# Patient Record
Sex: MALE | Race: BLACK OR AFRICAN AMERICAN | NOT HISPANIC OR LATINO | Employment: FULL TIME | URBAN - METROPOLITAN AREA
[De-identification: names, ages, dates, MRNs, and addresses within clinical notes are randomized per-mention and may not be internally consistent; named-entity substitution may affect disease eponyms.]

---

## 2017-01-06 ENCOUNTER — TRANSCRIBE ORDERS (OUTPATIENT)
Dept: ADMINISTRATIVE | Facility: HOSPITAL | Age: 37
End: 2017-01-06

## 2017-01-06 ENCOUNTER — HOSPITAL ENCOUNTER (OUTPATIENT)
Dept: CARDIOLOGY | Facility: HOSPITAL | Age: 37
Discharge: HOME | End: 2017-01-06
Attending: PSYCHIATRY & NEUROLOGY | Admitting: PSYCHIATRY & NEUROLOGY

## 2017-01-06 DIAGNOSIS — I82.402 DVT, LOWER EXTREMITY, RECURRENT, LEFT (HCC): Primary | ICD-10-CM

## 2017-01-06 DIAGNOSIS — I82.402 DVT, LOWER EXTREMITY, RECURRENT, LEFT (HCC): ICD-10-CM

## 2017-01-06 LAB
BH CV ECHO MEAS - BSA(HAYCOCK): 2.8 M^2
BH CV ECHO MEAS - BSA: 2.6 M^2
BH CV ECHO MEAS - BZI_BMI: 46.7 KILOGRAMS/M^2
BH CV ECHO MEAS - BZI_METRIC_HEIGHT: 180.3 CM
BH CV ECHO MEAS - BZI_METRIC_WEIGHT: 152 KG
BH CV LOWER VASCULAR LEFT COMMON FEMORAL AUGMENT: NORMAL
BH CV LOWER VASCULAR LEFT COMMON FEMORAL COMPRESS: NORMAL
BH CV LOWER VASCULAR LEFT COMMON FEMORAL PHASIC: NORMAL
BH CV LOWER VASCULAR LEFT COMMON FEMORAL SPONT: NORMAL
BH CV LOWER VASCULAR LEFT DISTAL FEMORAL AUGMENT: NORMAL
BH CV LOWER VASCULAR LEFT DISTAL FEMORAL COMPRESS: NORMAL
BH CV LOWER VASCULAR LEFT DISTAL FEMORAL PHASIC: NORMAL
BH CV LOWER VASCULAR LEFT DISTAL FEMORAL SPONT: NORMAL
BH CV LOWER VASCULAR LEFT GASTRONEMIUS COMPRESS: NORMAL
BH CV LOWER VASCULAR LEFT GREATER SAPH AK COMPRESS: NORMAL
BH CV LOWER VASCULAR LEFT GREATER SAPH BK COMPRESS: NORMAL
BH CV LOWER VASCULAR LEFT LESSER SAPH COMPRESS: NORMAL
BH CV LOWER VASCULAR LEFT MID FEMORAL AUGMENT: NORMAL
BH CV LOWER VASCULAR LEFT MID FEMORAL COMPRESS: NORMAL
BH CV LOWER VASCULAR LEFT MID FEMORAL PHASIC: NORMAL
BH CV LOWER VASCULAR LEFT MID FEMORAL SPONT: NORMAL
BH CV LOWER VASCULAR LEFT PERONEAL AUGMENT: NORMAL
BH CV LOWER VASCULAR LEFT PERONEAL COMPRESS: NORMAL
BH CV LOWER VASCULAR LEFT POPLITEAL AUGMENT: NORMAL
BH CV LOWER VASCULAR LEFT POPLITEAL COMPRESS: NORMAL
BH CV LOWER VASCULAR LEFT POPLITEAL PHASIC: NORMAL
BH CV LOWER VASCULAR LEFT POPLITEAL SPONT: NORMAL
BH CV LOWER VASCULAR LEFT POSTERIOR TIBIAL AUGMENT: NORMAL
BH CV LOWER VASCULAR LEFT POSTERIOR TIBIAL COMPRESS: NORMAL
BH CV LOWER VASCULAR LEFT PROXIMAL FEMORAL AUGMENT: NORMAL
BH CV LOWER VASCULAR LEFT PROXIMAL FEMORAL COMPRESS: NORMAL
BH CV LOWER VASCULAR LEFT PROXIMAL FEMORAL PHASIC: NORMAL
BH CV LOWER VASCULAR LEFT PROXIMAL FEMORAL SPONT: NORMAL
BH CV LOWER VASCULAR LEFT SAPHENOFEMORAL JUNCTION AUGMENT: NORMAL
BH CV LOWER VASCULAR LEFT SAPHENOFEMORAL JUNCTION COMPRESS: NORMAL
BH CV LOWER VASCULAR LEFT SAPHENOFEMORAL JUNCTION PHASIC: NORMAL
BH CV LOWER VASCULAR LEFT SAPHENOFEMORAL JUNCTION SPONT: NORMAL
BH CV LOWER VASCULAR RIGHT COMMON FEMORAL AUGMENT: NORMAL
BH CV LOWER VASCULAR RIGHT COMMON FEMORAL COMPRESS: NORMAL
BH CV LOWER VASCULAR RIGHT COMMON FEMORAL PHASIC: NORMAL
BH CV LOWER VASCULAR RIGHT COMMON FEMORAL SPONT: NORMAL
BH CV LOWER VASCULAR RIGHT PERONEAL AUGMENT: NORMAL
BH CV LOWER VASCULAR RIGHT POSTERIOR TIBIAL AUGMENT: NORMAL
BH CV LOWER VASCULAR RIGHT SAPHENOFEMORAL JUNCTION AUGMENT: NORMAL
BH CV LOWER VASCULAR RIGHT SAPHENOFEMORAL JUNCTION COMPRESS: NORMAL
BH CV LOWER VASCULAR RIGHT SAPHENOFEMORAL JUNCTION PHASIC: NORMAL
BH CV LOWER VASCULAR RIGHT SAPHENOFEMORAL JUNCTION SPONT: NORMAL

## 2017-01-06 PROCEDURE — 93971 EXTREMITY STUDY: CPT

## 2017-01-09 ENCOUNTER — TRANSCRIBE ORDERS (OUTPATIENT)
Dept: ADMINISTRATIVE | Facility: HOSPITAL | Age: 37
End: 2017-01-09

## 2017-01-09 DIAGNOSIS — M54.5 LOW BACK PAIN, UNSPECIFIED BACK PAIN LATERALITY, UNSPECIFIED CHRONICITY, WITH SCIATICA PRESENCE UNSPECIFIED: Primary | ICD-10-CM

## 2017-01-13 ENCOUNTER — HOSPITAL ENCOUNTER (OUTPATIENT)
Dept: MRI IMAGING | Facility: HOSPITAL | Age: 37
Discharge: HOME | End: 2017-01-13
Attending: PSYCHIATRY & NEUROLOGY | Admitting: PSYCHIATRY & NEUROLOGY

## 2017-01-13 DIAGNOSIS — M54.5 LOW BACK PAIN, UNSPECIFIED BACK PAIN LATERALITY, UNSPECIFIED CHRONICITY, WITH SCIATICA PRESENCE UNSPECIFIED: ICD-10-CM

## 2017-01-13 PROCEDURE — 72148 MRI LUMBAR SPINE W/O DYE: CPT

## 2018-10-24 ENCOUNTER — APPOINTMENT (OUTPATIENT)
Dept: GENERAL RADIOLOGY | Facility: HOSPITAL | Age: 38
End: 2018-10-24

## 2018-10-24 ENCOUNTER — HOSPITAL ENCOUNTER (INPATIENT)
Facility: HOSPITAL | Age: 38
LOS: 4 days | Discharge: HOME | End: 2018-10-28
Attending: EMERGENCY MEDICINE | Admitting: INTERNAL MEDICINE

## 2018-10-24 ENCOUNTER — APPOINTMENT (OUTPATIENT)
Dept: CT IMAGING | Facility: HOSPITAL | Age: 38
End: 2018-10-24

## 2018-10-24 DIAGNOSIS — R06.03 RESPIRATORY DISTRESS: ICD-10-CM

## 2018-10-24 DIAGNOSIS — R06.00 DYSPNEA, UNSPECIFIED TYPE: ICD-10-CM

## 2018-10-24 DIAGNOSIS — I26.99 BILATERAL PULMONARY EMBOLISM (HCC): Primary | ICD-10-CM

## 2018-10-24 DIAGNOSIS — R09.02 HYPOXIA: ICD-10-CM

## 2018-10-24 DIAGNOSIS — R07.9 CHEST PAIN, UNSPECIFIED TYPE: ICD-10-CM

## 2018-10-24 LAB
ALBUMIN SERPL-MCNC: 4.3 G/DL (ref 3.2–4.8)
ALBUMIN/GLOB SERPL: 1.2 G/DL (ref 1.5–2.5)
ALP SERPL-CCNC: 62 U/L (ref 25–100)
ALT SERPL W P-5'-P-CCNC: 20 U/L (ref 7–40)
ANION GAP SERPL CALCULATED.3IONS-SCNC: 6 MMOL/L (ref 3–11)
APTT PPP: 30.8 SECONDS (ref 55–70)
AST SERPL-CCNC: 20 U/L (ref 0–33)
BASOPHILS # BLD AUTO: 0.01 10*3/MM3 (ref 0–0.2)
BASOPHILS NFR BLD AUTO: 0.1 % (ref 0–1)
BILIRUB SERPL-MCNC: 0.8 MG/DL (ref 0.3–1.2)
BNP SERPL-MCNC: 162 PG/ML (ref 0–100)
BUN BLD-MCNC: 18 MG/DL (ref 9–23)
BUN/CREAT SERPL: 14 (ref 7–25)
CALCIUM SPEC-SCNC: 9.3 MG/DL (ref 8.7–10.4)
CHLORIDE SERPL-SCNC: 100 MMOL/L (ref 99–109)
CO2 SERPL-SCNC: 32 MMOL/L (ref 20–31)
CREAT BLD-MCNC: 1.29 MG/DL (ref 0.6–1.3)
DEPRECATED RDW RBC AUTO: 43.9 FL (ref 37–54)
EOSINOPHIL # BLD AUTO: 0.11 10*3/MM3 (ref 0–0.3)
EOSINOPHIL NFR BLD AUTO: 0.9 % (ref 0–3)
ERYTHROCYTE [DISTWIDTH] IN BLOOD BY AUTOMATED COUNT: 15 % (ref 11.3–14.5)
GFR SERPL CREATININE-BSD FRML MDRD: 76 ML/MIN/1.73
GLOBULIN UR ELPH-MCNC: 3.6 GM/DL
GLUCOSE BLD-MCNC: 94 MG/DL (ref 70–100)
HCT VFR BLD AUTO: 57.1 % (ref 38.9–50.9)
HGB BLD-MCNC: 17.8 G/DL (ref 13.1–17.5)
HOLD SPECIMEN: NORMAL
HOLD SPECIMEN: NORMAL
IMM GRANULOCYTES # BLD: 0.03 10*3/MM3 (ref 0–0.03)
IMM GRANULOCYTES NFR BLD: 0.3 % (ref 0–0.6)
INR PPP: 1.07 (ref 0.91–1.09)
LYMPHOCYTES # BLD AUTO: 2.7 10*3/MM3 (ref 0.6–4.8)
LYMPHOCYTES NFR BLD AUTO: 22.7 % (ref 24–44)
MCH RBC QN AUTO: 25.5 PG (ref 27–31)
MCHC RBC AUTO-ENTMCNC: 31.2 G/DL (ref 32–36)
MCV RBC AUTO: 81.9 FL (ref 80–99)
MONOCYTES # BLD AUTO: 0.97 10*3/MM3 (ref 0–1)
MONOCYTES NFR BLD AUTO: 8.1 % (ref 0–12)
NEUTROPHILS # BLD AUTO: 8.13 10*3/MM3 (ref 1.5–8.3)
NEUTROPHILS NFR BLD AUTO: 68.2 % (ref 41–71)
PLATELET # BLD AUTO: 230 10*3/MM3 (ref 150–450)
PMV BLD AUTO: 10.4 FL (ref 6–12)
POTASSIUM BLD-SCNC: 4.1 MMOL/L (ref 3.5–5.5)
PROT SERPL-MCNC: 7.9 G/DL (ref 5.7–8.2)
PROTHROMBIN TIME: 11.2 SECONDS (ref 9.6–11.5)
RBC # BLD AUTO: 6.97 10*6/MM3 (ref 4.2–5.76)
SODIUM BLD-SCNC: 138 MMOL/L (ref 132–146)
TROPONIN I SERPL-MCNC: 0.03 NG/ML (ref 0–0.07)
WBC NRBC COR # BLD: 11.92 10*3/MM3 (ref 3.5–10.8)
WHOLE BLOOD HOLD SPECIMEN: NORMAL
WHOLE BLOOD HOLD SPECIMEN: NORMAL

## 2018-10-24 PROCEDURE — 99285 EMERGENCY DEPT VISIT HI MDM: CPT

## 2018-10-24 PROCEDURE — 84145 PROCALCITONIN (PCT): CPT | Performed by: PHYSICIAN ASSISTANT

## 2018-10-24 PROCEDURE — 85610 PROTHROMBIN TIME: CPT | Performed by: EMERGENCY MEDICINE

## 2018-10-24 PROCEDURE — 93005 ELECTROCARDIOGRAM TRACING: CPT | Performed by: EMERGENCY MEDICINE

## 2018-10-24 PROCEDURE — 25010000002 HEPARIN (PORCINE) PER 1000 UNITS: Performed by: EMERGENCY MEDICINE

## 2018-10-24 PROCEDURE — 80053 COMPREHEN METABOLIC PANEL: CPT | Performed by: EMERGENCY MEDICINE

## 2018-10-24 PROCEDURE — 83880 ASSAY OF NATRIURETIC PEPTIDE: CPT | Performed by: EMERGENCY MEDICINE

## 2018-10-24 PROCEDURE — 71275 CT ANGIOGRAPHY CHEST: CPT

## 2018-10-24 PROCEDURE — 84484 ASSAY OF TROPONIN QUANT: CPT

## 2018-10-24 PROCEDURE — 85730 THROMBOPLASTIN TIME PARTIAL: CPT | Performed by: EMERGENCY MEDICINE

## 2018-10-24 PROCEDURE — 0 IOPAMIDOL PER 1 ML: Performed by: EMERGENCY MEDICINE

## 2018-10-24 PROCEDURE — 93005 ELECTROCARDIOGRAM TRACING: CPT | Performed by: INTERNAL MEDICINE

## 2018-10-24 PROCEDURE — 85025 COMPLETE CBC W/AUTO DIFF WBC: CPT | Performed by: EMERGENCY MEDICINE

## 2018-10-24 RX ORDER — HEPARIN SODIUM 1000 [USP'U]/ML
10000 INJECTION, SOLUTION INTRAVENOUS; SUBCUTANEOUS AS NEEDED
Status: DISCONTINUED | OUTPATIENT
Start: 2018-10-24 | End: 2018-10-24 | Stop reason: DRUGHIGH

## 2018-10-24 RX ORDER — HEPARIN SODIUM 1000 [USP'U]/ML
10000 INJECTION, SOLUTION INTRAVENOUS; SUBCUTANEOUS ONCE
Status: COMPLETED | OUTPATIENT
Start: 2018-10-24 | End: 2018-10-24

## 2018-10-24 RX ORDER — SODIUM CHLORIDE 0.9 % (FLUSH) 0.9 %
10 SYRINGE (ML) INJECTION AS NEEDED
Status: DISCONTINUED | OUTPATIENT
Start: 2018-10-24 | End: 2018-10-26

## 2018-10-24 RX ORDER — HEPARIN SODIUM 1000 [USP'U]/ML
40 INJECTION, SOLUTION INTRAVENOUS; SUBCUTANEOUS AS NEEDED
Status: DISCONTINUED | OUTPATIENT
Start: 2018-10-24 | End: 2018-10-24 | Stop reason: DRUGHIGH

## 2018-10-24 RX ADMIN — HEPARIN SODIUM 8.6 UNITS/KG/HR: 10000 INJECTION, SOLUTION INTRAVENOUS at 23:12

## 2018-10-24 RX ADMIN — IOPAMIDOL 100 ML: 755 INJECTION, SOLUTION INTRAVENOUS at 22:15

## 2018-10-24 RX ADMIN — HEPARIN SODIUM 10000 UNITS: 1000 INJECTION, SOLUTION INTRAVENOUS; SUBCUTANEOUS at 23:11

## 2018-10-25 PROBLEM — G47.33 OSA (OBSTRUCTIVE SLEEP APNEA): Status: ACTIVE | Noted: 2018-10-25

## 2018-10-25 PROBLEM — D72.829 LEUKOCYTOSIS: Status: ACTIVE | Noted: 2018-10-25

## 2018-10-25 LAB
ANION GAP SERPL CALCULATED.3IONS-SCNC: 8 MMOL/L (ref 3–11)
APTT PPP: 39.3 SECONDS (ref 55–70)
APTT PPP: 47.6 SECONDS (ref 55–70)
APTT PPP: 89.1 SECONDS (ref 55–70)
BUN BLD-MCNC: 18 MG/DL (ref 9–23)
BUN/CREAT SERPL: 14.2 (ref 7–25)
CALCIUM SPEC-SCNC: 8.7 MG/DL (ref 8.7–10.4)
CHLORIDE SERPL-SCNC: 99 MMOL/L (ref 99–109)
CO2 SERPL-SCNC: 31 MMOL/L (ref 20–31)
CREAT BLD-MCNC: 1.27 MG/DL (ref 0.6–1.3)
DEPRECATED RDW RBC AUTO: 44.5 FL (ref 37–54)
ERYTHROCYTE [DISTWIDTH] IN BLOOD BY AUTOMATED COUNT: 15 % (ref 11.3–14.5)
GFR SERPL CREATININE-BSD FRML MDRD: 77 ML/MIN/1.73
GLUCOSE BLD-MCNC: 96 MG/DL (ref 70–100)
HCT VFR BLD AUTO: 53.4 % (ref 38.9–50.9)
HCYS SERPL-MCNC: 13.8 UMOL/L (ref 5–13.9)
HGB BLD-MCNC: 16.5 G/DL (ref 13.1–17.5)
MCH RBC QN AUTO: 25.3 PG (ref 27–31)
MCHC RBC AUTO-ENTMCNC: 30.9 G/DL (ref 32–36)
MCV RBC AUTO: 82 FL (ref 80–99)
PLATELET # BLD AUTO: 234 10*3/MM3 (ref 150–450)
PMV BLD AUTO: 9.9 FL (ref 6–12)
POTASSIUM BLD-SCNC: 4.7 MMOL/L (ref 3.5–5.5)
PROCALCITONIN SERPL-MCNC: <0.05 NG/ML
RBC # BLD AUTO: 6.51 10*6/MM3 (ref 4.2–5.76)
SODIUM BLD-SCNC: 138 MMOL/L (ref 132–146)
WBC NRBC COR # BLD: 11.39 10*3/MM3 (ref 3.5–10.8)

## 2018-10-25 PROCEDURE — 85300 ANTITHROMBIN III ACTIVITY: CPT | Performed by: INTERNAL MEDICINE

## 2018-10-25 PROCEDURE — 85613 RUSSELL VIPER VENOM DILUTED: CPT | Performed by: INTERNAL MEDICINE

## 2018-10-25 PROCEDURE — 85306 CLOT INHIBIT PROT S FREE: CPT | Performed by: INTERNAL MEDICINE

## 2018-10-25 PROCEDURE — 81240 F2 GENE: CPT | Performed by: INTERNAL MEDICINE

## 2018-10-25 PROCEDURE — 85705 THROMBOPLASTIN INHIBITION: CPT | Performed by: INTERNAL MEDICINE

## 2018-10-25 PROCEDURE — 83090 ASSAY OF HOMOCYSTEINE: CPT | Performed by: INTERNAL MEDICINE

## 2018-10-25 PROCEDURE — 25010000002 HEPARIN (PORCINE) PER 1000 UNITS

## 2018-10-25 PROCEDURE — 80048 BASIC METABOLIC PNL TOTAL CA: CPT | Performed by: PHYSICIAN ASSISTANT

## 2018-10-25 PROCEDURE — 85670 THROMBIN TIME PLASMA: CPT | Performed by: INTERNAL MEDICINE

## 2018-10-25 PROCEDURE — 99223 1ST HOSP IP/OBS HIGH 75: CPT | Performed by: INTERNAL MEDICINE

## 2018-10-25 PROCEDURE — 81241 F5 GENE: CPT | Performed by: INTERNAL MEDICINE

## 2018-10-25 PROCEDURE — 86147 CARDIOLIPIN ANTIBODY EA IG: CPT | Performed by: INTERNAL MEDICINE

## 2018-10-25 PROCEDURE — 85730 THROMBOPLASTIN TIME PARTIAL: CPT

## 2018-10-25 PROCEDURE — 99233 SBSQ HOSP IP/OBS HIGH 50: CPT | Performed by: INTERNAL MEDICINE

## 2018-10-25 PROCEDURE — 85732 THROMBOPLASTIN TIME PARTIAL: CPT | Performed by: INTERNAL MEDICINE

## 2018-10-25 PROCEDURE — 85303 CLOT INHIBIT PROT C ACTIVITY: CPT | Performed by: INTERNAL MEDICINE

## 2018-10-25 PROCEDURE — 85027 COMPLETE CBC AUTOMATED: CPT | Performed by: PHYSICIAN ASSISTANT

## 2018-10-25 RX ORDER — SODIUM CHLORIDE 0.9 % (FLUSH) 0.9 %
3 SYRINGE (ML) INJECTION EVERY 12 HOURS SCHEDULED
Status: DISCONTINUED | OUTPATIENT
Start: 2018-10-25 | End: 2018-10-26

## 2018-10-25 RX ORDER — HYDROCODONE BITARTRATE AND ACETAMINOPHEN 7.5; 325 MG/1; MG/1
1 TABLET ORAL EVERY 4 HOURS PRN
Status: DISCONTINUED | OUTPATIENT
Start: 2018-10-25 | End: 2018-10-28

## 2018-10-25 RX ORDER — HEPARIN SODIUM 1000 [USP'U]/ML
7000 INJECTION, SOLUTION INTRAVENOUS; SUBCUTANEOUS ONCE
Status: COMPLETED | OUTPATIENT
Start: 2018-10-26 | End: 2018-10-25

## 2018-10-25 RX ORDER — HYDROCODONE BITARTRATE AND ACETAMINOPHEN 5; 325 MG/1; MG/1
1 TABLET ORAL EVERY 6 HOURS PRN
Status: DISCONTINUED | OUTPATIENT
Start: 2018-10-25 | End: 2018-10-25

## 2018-10-25 RX ORDER — ACETAMINOPHEN 325 MG/1
650 TABLET ORAL EVERY 4 HOURS PRN
Status: DISCONTINUED | OUTPATIENT
Start: 2018-10-25 | End: 2018-10-28 | Stop reason: HOSPADM

## 2018-10-25 RX ORDER — HEPARIN SODIUM 1000 [USP'U]/ML
10000 INJECTION, SOLUTION INTRAVENOUS; SUBCUTANEOUS ONCE
Status: COMPLETED | OUTPATIENT
Start: 2018-10-25 | End: 2018-10-25

## 2018-10-25 RX ORDER — SODIUM CHLORIDE 0.9 % (FLUSH) 0.9 %
3-10 SYRINGE (ML) INJECTION AS NEEDED
Status: DISCONTINUED | OUTPATIENT
Start: 2018-10-25 | End: 2018-10-28 | Stop reason: HOSPADM

## 2018-10-25 RX ADMIN — HEPARIN SODIUM 10 UNITS/KG/HR: 10000 INJECTION, SOLUTION INTRAVENOUS at 16:17

## 2018-10-25 RX ADMIN — HEPARIN SODIUM 7000 UNITS: 1000 INJECTION, SOLUTION INTRAVENOUS; SUBCUTANEOUS at 23:41

## 2018-10-25 RX ADMIN — HYDROCODONE BITARTRATE AND ACETAMINOPHEN 1 TABLET: 7.5; 325 TABLET ORAL at 19:02

## 2018-10-25 RX ADMIN — HEPARIN SODIUM 12 UNITS/KG/HR: 10000 INJECTION, SOLUTION INTRAVENOUS at 14:14

## 2018-10-25 RX ADMIN — HYDROCODONE BITARTRATE AND ACETAMINOPHEN 1 TABLET: 5; 325 TABLET ORAL at 15:34

## 2018-10-25 RX ADMIN — HEPARIN SODIUM 10000 UNITS: 1000 INJECTION, SOLUTION INTRAVENOUS; SUBCUTANEOUS at 09:08

## 2018-10-26 ENCOUNTER — APPOINTMENT (OUTPATIENT)
Dept: CARDIOLOGY | Facility: HOSPITAL | Age: 38
End: 2018-10-26
Attending: INTERNAL MEDICINE

## 2018-10-26 LAB
APTT PPP: 44.3 SECONDS (ref 55–70)
APTT PPP: 86.5 SECONDS (ref 55–70)
BH CV ECHO MEAS - AO ROOT AREA (BSA CORRECTED): 0.99
BH CV ECHO MEAS - AO ROOT AREA: 6 CM^2
BH CV ECHO MEAS - AO ROOT DIAM: 2.8 CM
BH CV ECHO MEAS - BSA(HAYCOCK): 3.1 M^2
BH CV ECHO MEAS - BSA(HAYCOCK): 3.1 M^2
BH CV ECHO MEAS - BSA: 2.8 M^2
BH CV ECHO MEAS - BSA: 2.8 M^2
BH CV ECHO MEAS - BZI_BMI: 53.8 KILOGRAMS/M^2
BH CV ECHO MEAS - BZI_BMI: 53.8 KILOGRAMS/M^2
BH CV ECHO MEAS - BZI_METRIC_HEIGHT: 180.3 CM
BH CV ECHO MEAS - BZI_METRIC_HEIGHT: 180.3 CM
BH CV ECHO MEAS - BZI_METRIC_WEIGHT: 175.1 KG
BH CV ECHO MEAS - BZI_METRIC_WEIGHT: 175.1 KG
BH CV ECHO MEAS - EDV(CUBED): 77.6 ML
BH CV ECHO MEAS - EDV(TEICH): 81.5 ML
BH CV ECHO MEAS - EF(CUBED): 62.4 %
BH CV ECHO MEAS - EF(MOD-BP): 54 %
BH CV ECHO MEAS - EF(TEICH): 54.3 %
BH CV ECHO MEAS - ESV(CUBED): 29.2 ML
BH CV ECHO MEAS - ESV(TEICH): 37.3 ML
BH CV ECHO MEAS - FS: 27.8 %
BH CV ECHO MEAS - IVS/LVPW: 1.1
BH CV ECHO MEAS - IVSD: 1.4 CM
BH CV ECHO MEAS - LV MASS(C)D: 208.9 GRAMS
BH CV ECHO MEAS - LV MASS(C)DI: 74.9 GRAMS/M^2
BH CV ECHO MEAS - LVIDD: 4.3 CM
BH CV ECHO MEAS - LVIDS: 3.1 CM
BH CV ECHO MEAS - LVPWD: 1.2 CM
BH CV ECHO MEAS - RAP SYSTOLE: 15 MMHG
BH CV ECHO MEAS - RVSP: 76 MMHG
BH CV ECHO MEAS - SI(CUBED): 17.4 ML/M^2
BH CV ECHO MEAS - SI(TEICH): 15.9 ML/M^2
BH CV ECHO MEAS - SV(CUBED): 48.4 ML
BH CV ECHO MEAS - SV(TEICH): 44.2 ML
BH CV ECHO MEAS - TAPSE (>1.6): 1.4 CM2
BH CV ECHO MEAS - TR MAX PG: 61 MMHG
BH CV ECHO MEAS - TR MAX VEL: 390.5 CM/SEC
BH CV LOW VAS LEFT COMMON FEMORAL SPONT: 1
BH CV LOW VAS LEFT PROXIMAL FEMORAL SPONT: 1
BH CV LOW VAS RIGHT COMMON FEMORAL SPONT: 1
BH CV LOW VAS RIGHT POPLITEAL SPONT: 1
BH CV LOW VAS RIGHT PROXIMAL FEMORAL SPONT: 1
BH CV LOWER VASCULAR LEFT COMMON FEMORAL AUGMENT: NORMAL
BH CV LOWER VASCULAR LEFT COMMON FEMORAL COMPETENT: NORMAL
BH CV LOWER VASCULAR LEFT COMMON FEMORAL COMPRESS: NORMAL
BH CV LOWER VASCULAR LEFT COMMON FEMORAL PHASIC: NORMAL
BH CV LOWER VASCULAR LEFT COMMON FEMORAL SPONT: NORMAL
BH CV LOWER VASCULAR LEFT GASTRONEMIUS COMPRESS: NORMAL
BH CV LOWER VASCULAR LEFT GREATER SAPH AK COMPRESS: NORMAL
BH CV LOWER VASCULAR LEFT PROXIMAL FEMORAL COMPRESS: NORMAL
BH CV LOWER VASCULAR RIGHT COMMON FEMORAL COMPRESS: NORMAL
BH CV LOWER VASCULAR RIGHT COMMON FEMORAL PHASIC: NORMAL
BH CV LOWER VASCULAR RIGHT COMMON FEMORAL SPONT: NORMAL
BH CV LOWER VASCULAR RIGHT GASTRONEMIUS COMPRESS: NORMAL
BH CV LOWER VASCULAR RIGHT GREATER SAPH AK COMPRESS: NORMAL
BH CV LOWER VASCULAR RIGHT MID FEMORAL COMPRESS: NORMAL
BH CV LOWER VASCULAR RIGHT MID FEMORAL PHASIC: NORMAL
BH CV LOWER VASCULAR RIGHT MID FEMORAL SPONT: NORMAL
BH CV LOWER VASCULAR RIGHT POPLITEAL AUGMENT: NORMAL
BH CV LOWER VASCULAR RIGHT POPLITEAL COMPRESS: NORMAL
BH CV LOWER VASCULAR RIGHT POPLITEAL PHASIC: NORMAL
BH CV LOWER VASCULAR RIGHT POPLITEAL SPONT: NORMAL
BH CV LOWER VASCULAR RIGHT PROXIMAL FEMORAL PHASIC: NORMAL
BH CV LOWER VASCULAR RIGHT PROXIMAL FEMORAL SPONT: NORMAL
BH CV LOWER VASCULAR RIGHT SAPHENOFEMORAL JUNCTION AUGMENT: NORMAL
BH CV LOWER VASCULAR RIGHT SAPHENOFEMORAL JUNCTION COMPRESS: NORMAL
BH CV LOWER VASCULAR RIGHT SAPHENOFEMORAL JUNCTION PHASIC: NORMAL
BH CV LOWER VASCULAR RIGHT SAPHENOFEMORAL JUNCTION SPONT: NORMAL
BH CV VAS BP LEFT ARM: NORMAL MMHG
CARDIOLIPIN IGG SER IA-ACNC: 14 GPL U/ML (ref 0–14)
CARDIOLIPIN IGM SER IA-ACNC: 9 MPL U/ML (ref 0–12)
LV EF 2D ECHO EST: 55 %

## 2018-10-26 PROCEDURE — 25010000002 HEPARIN (PORCINE) PER 1000 UNITS

## 2018-10-26 PROCEDURE — 93971 EXTREMITY STUDY: CPT

## 2018-10-26 PROCEDURE — 93325 DOPPLER ECHO COLOR FLOW MAPG: CPT | Performed by: INTERNAL MEDICINE

## 2018-10-26 PROCEDURE — 99233 SBSQ HOSP IP/OBS HIGH 50: CPT | Performed by: INTERNAL MEDICINE

## 2018-10-26 PROCEDURE — 93308 TTE F-UP OR LMTD: CPT

## 2018-10-26 PROCEDURE — 93321 DOPPLER ECHO F-UP/LMTD STD: CPT

## 2018-10-26 PROCEDURE — 93308 TTE F-UP OR LMTD: CPT | Performed by: INTERNAL MEDICINE

## 2018-10-26 PROCEDURE — 93321 DOPPLER ECHO F-UP/LMTD STD: CPT | Performed by: INTERNAL MEDICINE

## 2018-10-26 PROCEDURE — 85730 THROMBOPLASTIN TIME PARTIAL: CPT

## 2018-10-26 PROCEDURE — 93971 EXTREMITY STUDY: CPT | Performed by: INTERNAL MEDICINE

## 2018-10-26 PROCEDURE — 93325 DOPPLER ECHO COLOR FLOW MAPG: CPT

## 2018-10-26 RX ORDER — SENNA AND DOCUSATE SODIUM 50; 8.6 MG/1; MG/1
2 TABLET, FILM COATED ORAL NIGHTLY
Status: DISCONTINUED | OUTPATIENT
Start: 2018-10-26 | End: 2018-10-28 | Stop reason: HOSPADM

## 2018-10-26 RX ORDER — HEPARIN SODIUM 1000 [USP'U]/ML
7000 INJECTION, SOLUTION INTRAVENOUS; SUBCUTANEOUS ONCE
Status: COMPLETED | OUTPATIENT
Start: 2018-10-26 | End: 2018-10-26

## 2018-10-26 RX ADMIN — HYDROCODONE BITARTRATE AND ACETAMINOPHEN 1 TABLET: 7.5; 325 TABLET ORAL at 20:10

## 2018-10-26 RX ADMIN — HEPARIN SODIUM 7000 UNITS: 1000 INJECTION, SOLUTION INTRAVENOUS; SUBCUTANEOUS at 09:10

## 2018-10-26 RX ADMIN — HEPARIN SODIUM 14 UNITS/KG/HR: 10000 INJECTION, SOLUTION INTRAVENOUS at 15:48

## 2018-10-26 RX ADMIN — HYDROCODONE BITARTRATE AND ACETAMINOPHEN 1 TABLET: 7.5; 325 TABLET ORAL at 05:13

## 2018-10-26 RX ADMIN — HYDROCODONE BITARTRATE AND ACETAMINOPHEN 1 TABLET: 7.5; 325 TABLET ORAL at 15:30

## 2018-10-26 RX ADMIN — SENNOSIDES AND DOCUSATE SODIUM 2 TABLET: 8.6; 5 TABLET ORAL at 20:10

## 2018-10-27 PROBLEM — J96.01 ACUTE HYPOXEMIC RESPIRATORY FAILURE (HCC): Status: ACTIVE | Noted: 2018-10-27

## 2018-10-27 PROBLEM — I82.413 ACUTE BILATERAL DEEP VEIN THROMBOSIS (DVT) OF FEMORAL VEINS: Status: ACTIVE | Noted: 2018-10-27

## 2018-10-27 LAB
ANION GAP SERPL CALCULATED.3IONS-SCNC: 3 MMOL/L (ref 3–11)
APTT PPP: 59.7 SECONDS (ref 55–70)
APTT PPP: 65.7 SECONDS (ref 55–70)
AT III PPP CHRO-ACNC: 84 % (ref 75–135)
BUN BLD-MCNC: 19 MG/DL (ref 9–23)
BUN/CREAT SERPL: 16.8 (ref 7–25)
CALCIUM SPEC-SCNC: 8.8 MG/DL (ref 8.7–10.4)
CHLORIDE SERPL-SCNC: 102 MMOL/L (ref 99–109)
CO2 SERPL-SCNC: 28 MMOL/L (ref 20–31)
CREAT BLD-MCNC: 1.13 MG/DL (ref 0.6–1.3)
DEPRECATED RDW RBC AUTO: 44.5 FL (ref 37–54)
DRVVT IMM 1:2 NP PPP: 41.4 SEC (ref 0–47)
ERYTHROCYTE [DISTWIDTH] IN BLOOD BY AUTOMATED COUNT: 14.7 % (ref 11.3–14.5)
GFR SERPL CREATININE-BSD FRML MDRD: 88 ML/MIN/1.73
GLUCOSE BLD-MCNC: 104 MG/DL (ref 70–100)
HCT VFR BLD AUTO: 50.3 % (ref 38.9–50.9)
HGB BLD-MCNC: 15.5 G/DL (ref 13.1–17.5)
LA NT DPL PPP: 59.6 SEC (ref 0–55)
LA NT DPL/LA NT HPL PPP-RTO: 1.05 RATIO (ref 0–1.4)
LA NT PLATELET PPP: 44 SEC (ref 0–51.9)
LUPUS ANTICOAGULANT REFLEX: ABNORMAL
MAGNESIUM SERPL-MCNC: 2 MG/DL (ref 1.3–2.7)
MCH RBC QN AUTO: 25.6 PG (ref 27–31)
MCHC RBC AUTO-ENTMCNC: 30.8 G/DL (ref 32–36)
MCV RBC AUTO: 83.1 FL (ref 80–99)
PHOSPHATE SERPL-MCNC: 4.1 MG/DL (ref 2.4–5.1)
PLATELET # BLD AUTO: 231 10*3/MM3 (ref 150–450)
PMV BLD AUTO: 9.9 FL (ref 6–12)
POTASSIUM BLD-SCNC: 4.9 MMOL/L (ref 3.5–5.5)
POTASSIUM BLD-SCNC: 4.9 MMOL/L (ref 3.5–5.5)
PROTEIN S-FUNCTIONAL: 80 % (ref 63–140)
RBC # BLD AUTO: 6.05 10*6/MM3 (ref 4.2–5.76)
SCREEN DRVVT: 47.2 SEC (ref 0–47)
SODIUM BLD-SCNC: 133 MMOL/L (ref 132–146)
THROMBIN TIME: 26.2 SEC (ref 0–23)
WBC NRBC COR # BLD: 13.5 10*3/MM3 (ref 3.5–10.8)

## 2018-10-27 PROCEDURE — 84132 ASSAY OF SERUM POTASSIUM: CPT | Performed by: NURSE PRACTITIONER

## 2018-10-27 PROCEDURE — 85730 THROMBOPLASTIN TIME PARTIAL: CPT

## 2018-10-27 PROCEDURE — 83735 ASSAY OF MAGNESIUM: CPT | Performed by: NURSE PRACTITIONER

## 2018-10-27 PROCEDURE — 25010000002 HEPARIN (PORCINE) PER 1000 UNITS

## 2018-10-27 PROCEDURE — 99254 IP/OBS CNSLTJ NEW/EST MOD 60: CPT | Performed by: INTERNAL MEDICINE

## 2018-10-27 PROCEDURE — 80048 BASIC METABOLIC PNL TOTAL CA: CPT | Performed by: INTERNAL MEDICINE

## 2018-10-27 PROCEDURE — 99233 SBSQ HOSP IP/OBS HIGH 50: CPT | Performed by: INTERNAL MEDICINE

## 2018-10-27 PROCEDURE — 84100 ASSAY OF PHOSPHORUS: CPT | Performed by: NURSE PRACTITIONER

## 2018-10-27 PROCEDURE — 85027 COMPLETE CBC AUTOMATED: CPT

## 2018-10-27 RX ORDER — BISOPROLOL FUMARATE 5 MG/1
5 TABLET, FILM COATED ORAL
Status: DISCONTINUED | OUTPATIENT
Start: 2018-10-27 | End: 2018-10-28 | Stop reason: HOSPADM

## 2018-10-27 RX ADMIN — APIXABAN 10 MG: 5 TABLET, FILM COATED ORAL at 20:12

## 2018-10-27 RX ADMIN — SENNOSIDES AND DOCUSATE SODIUM 2 TABLET: 8.6; 5 TABLET ORAL at 20:12

## 2018-10-27 RX ADMIN — HYDROCODONE BITARTRATE AND ACETAMINOPHEN 1 TABLET: 7.5; 325 TABLET ORAL at 00:15

## 2018-10-27 RX ADMIN — HEPARIN SODIUM 13 UNITS/KG/HR: 10000 INJECTION, SOLUTION INTRAVENOUS at 03:16

## 2018-10-27 RX ADMIN — Medication 10 ML: at 20:13

## 2018-10-27 RX ADMIN — HYDROCODONE BITARTRATE AND ACETAMINOPHEN 1 TABLET: 7.5; 325 TABLET ORAL at 16:28

## 2018-10-27 RX ADMIN — BISOPROLOL FUMARATE 5 MG: 5 TABLET ORAL at 16:28

## 2018-10-28 VITALS
DIASTOLIC BLOOD PRESSURE: 73 MMHG | BODY MASS INDEX: 44.1 KG/M2 | HEART RATE: 92 BPM | OXYGEN SATURATION: 96 % | TEMPERATURE: 98.3 F | WEIGHT: 315 LBS | HEIGHT: 71 IN | SYSTOLIC BLOOD PRESSURE: 119 MMHG | RESPIRATION RATE: 18 BRPM

## 2018-10-28 PROCEDURE — 99232 SBSQ HOSP IP/OBS MODERATE 35: CPT | Performed by: NURSE PRACTITIONER

## 2018-10-28 PROCEDURE — 99239 HOSP IP/OBS DSCHRG MGMT >30: CPT | Performed by: INTERNAL MEDICINE

## 2018-10-28 RX ORDER — ACETAMINOPHEN 325 MG/1
650 TABLET ORAL EVERY 4 HOURS PRN
Start: 2018-10-28 | End: 2018-11-01

## 2018-10-28 RX ORDER — BISOPROLOL FUMARATE 5 MG/1
5 TABLET, FILM COATED ORAL
Qty: 30 TABLET | Refills: 1 | Status: SHIPPED | OUTPATIENT
Start: 2018-10-29 | End: 2018-10-29

## 2018-10-28 RX ADMIN — APIXABAN 10 MG: 5 TABLET, FILM COATED ORAL at 08:50

## 2018-10-28 RX ADMIN — BISOPROLOL FUMARATE 5 MG: 5 TABLET ORAL at 08:50

## 2018-10-29 ENCOUNTER — READMISSION MANAGEMENT (OUTPATIENT)
Dept: CALL CENTER | Facility: HOSPITAL | Age: 38
End: 2018-10-29

## 2018-10-29 ENCOUNTER — TRANSITIONAL CARE MANAGEMENT TELEPHONE ENCOUNTER (OUTPATIENT)
Dept: FAMILY MEDICINE CLINIC | Facility: CLINIC | Age: 38
End: 2018-10-29

## 2018-10-29 LAB — PRT C ACTIVITY (CHROMOGENIC): 109 %

## 2018-10-29 RX ORDER — BISOPROLOL FUMARATE 5 MG/1
5 TABLET, FILM COATED ORAL
Qty: 30 TABLET | Refills: 1 | Status: SHIPPED | OUTPATIENT
Start: 2018-10-29 | End: 2018-11-29 | Stop reason: SDUPTHER

## 2018-10-29 NOTE — OUTREACH NOTE
Prep Survey      Responses   Facility patient discharged from?  [place]   Is patient eligible?  Yes   Discharge diagnosis  hypoxia and pulm embolism   Does the patient have one of the following disease processes/diagnoses(primary or secondary)?  Other   Does the patient have Home health ordered?  No   Is there a DME ordered?  Yes   What DME was ordered?  O2, BIPAP/CPAP [facility]   Comments regarding appointments  call for apmt   Is this a private patient?  Yes   Prep survey completed?  Yes          Sandi Rodriguez RN

## 2018-10-29 NOTE — OUTREACH NOTE
[facility] call completed. Please see flow sheet for additional details.  Pt states sx stable since discharge.  Denies fever/cough/increased pain.  Left-sided & occas anterior chest discomfort conts if lying down - no worsening since DC.  Denies N/V/D/C. Appetite good.  Confirms he started all discharge RXs & has no questions. Confirms 11/1/18 PCP [facility].  he will call Dr Webster office today to schedule 7-day F/U.

## 2018-10-30 ENCOUNTER — READMISSION MANAGEMENT (OUTPATIENT)
Dept: CALL CENTER | Facility: HOSPITAL | Age: 38
End: 2018-10-30

## 2018-10-30 LAB
F2 GENE MUT ANL BLD/T: NORMAL
F5 GENE MUT ANL BLD/T: NORMAL

## 2018-10-30 NOTE — OUTREACH NOTE
Medical Week 1 Survey      Responses   Facility patient discharged from?  [place]   Does the patient have one of the following disease processes/diagnoses(primary or secondary)?  Other   Is there a successful TCM telephone encounter documented?  Yes          Wisam Houston RN

## 2018-11-01 ENCOUNTER — OFFICE VISIT (OUTPATIENT)
Dept: FAMILY MEDICINE CLINIC | Facility: CLINIC | Age: 38
End: 2018-11-01

## 2018-11-01 VITALS
SYSTOLIC BLOOD PRESSURE: 128 MMHG | BODY MASS INDEX: 44.1 KG/M2 | TEMPERATURE: 98.2 F | DIASTOLIC BLOOD PRESSURE: 86 MMHG | HEIGHT: 71 IN | HEART RATE: 77 BPM | RESPIRATION RATE: 18 BRPM | WEIGHT: 315 LBS | OXYGEN SATURATION: 96 %

## 2018-11-01 DIAGNOSIS — I26.99 BILATERAL PULMONARY EMBOLISM (HCC): Primary | ICD-10-CM

## 2018-11-01 DIAGNOSIS — I82.413 ACUTE BILATERAL DEEP VEIN THROMBOSIS (DVT) OF FEMORAL VEINS (HCC): ICD-10-CM

## 2018-11-01 PROCEDURE — 99496 TRANSJ CARE MGMT HIGH F2F 7D: CPT | Performed by: PHYSICIAN ASSISTANT

## 2018-11-01 NOTE — PROGRESS NOTES
Subjective    is being seen for follow-up after hospitalization at [facility]    The date of hospitalization were   Date of Admission: 10/24/2018  Date of Discharge:  10/28/2018  Discharge Diagnosis during hospitalization was   **Bilateral pulmonary embolism (CMS/HCC) [I26.99] 10/24/2018    • Acute bilateral deep vein thrombosis (DVT) of femoral veins (CMS/HCC) [I82.413] 10/27/2018   • Acute hypoxemic respiratory failure (CMS/HCC) [J96.01] 10/27/2018   • Leukocytosis [D72.829] 10/25/2018   • RAFAEL (obstructive sleep apnea) [G47.33         Hospital course:   Satish Will is a 38 y.o. male  presenting with acute onset dyspnea with mild exertion.  CT-A showed bilat extensive PE's.  Venous duplex showed acute bilateral common femoral DVT.  Echocardiogram did show some evidence of right ventricular dysfunction and elevated RVSP.  Cardiology was consulted and felt EKOS was not indicated given amount of time passed since presentation.  Patient was transitioned from heparin drip to Eliquis.  Oxygen was weaned to 2-4L via nasal cannula.  Patient was started on bisoprolol per cardiology recommendations.  He certainly has untreated sleep apnea and has completed a sleep study but has not obtained his CPAP machine yet.  He was urged to obtain CPAP machine as soon as possible.  He will need close follow up with his PCP to monitor oxygen needs and will follow up with Dr. Webster in December.   Discharged to: Home with appointments to PCP and cardiology/pulmonology    Discharged on the following medicines: See below     Discharge Medications          Accurate as of 11/1/18  4:23 PM. If you have any questions, ask your nurse or doctor.               Continue These Medications      Instructions Start Date   bisoprolol 5 MG tablet  Commonly known as:  ZEBeta   5 mg, Oral, Every 24 Hours Scheduled      ELIQUIS 5 MG tablet tablet  Generic drug:  apixaban   10 mg, Oral, Every 12 Hours Scheduled       apixaban 5 MG tablet tablet  Commonly known as:  ELIQUIS   5 mg, Oral, Every 12 Hours Scheduled   Start Date:  11/3/2018        Stop These Medications    acetaminophen 325 MG tablet  Commonly known as:  TYLENOL  Stopped by:  MANNY Vivar     OTEZLA PO  Stopped by:  MANNY Vivar              Information from the hospitalization was given to patient    Review of Systems   Constitutional: Negative for appetite change, diaphoresis, fatigue and unexpected weight change.   Eyes: Negative for visual disturbance.   Respiratory: Negative for chest tightness and shortness of breath.    Cardiovascular: Negative for chest pain, palpitations and leg swelling.   Gastrointestinal: Negative for diarrhea, nausea and vomiting.   Endocrine: Negative for polydipsia, polyphagia and polyuria.   Skin: Negative for color change.   Neurological: Negative for dizziness, weakness, light-headedness and numbness.       Objective     Vitals:    11/01/18 1044   BP: 128/86   Pulse: 77   Resp: 18   Temp: 98.2 °F (36.8 °C)   SpO2: 96%       Physical Exam   Constitutional: He appears well-developed and well-nourished.   Neck: No JVD present.   Cardiovascular: Normal rate, regular rhythm, normal heart sounds, intact distal pulses and normal pulses.    No murmur heard.  Pulmonary/Chest: Effort normal and breath sounds normal. No respiratory distress.   Abdominal: Soft. Bowel sounds are normal. There is no hepatosplenomegaly. There is no tenderness.   Musculoskeletal: He exhibits no edema.   Skin: Skin is warm and dry.   Nursing note and vitals reviewed.      Assessment/Plan     Problem List Items Addressed This Visit        Cardiovascular and Mediastinum    Bilateral pulmonary embolism (CMS/HCC) - Primary    Acute bilateral deep vein thrombosis (DVT) of femoral veins (CMS/HCC)      #1 continue Eliquis    #2 referral for CPAP      Transitional Care Management Certification  I certify that the following are  true:  1. Communication was made within 2 business days of discharge.  2. Complexity of Medical Decision Making is moderate.  3. Face to face visit occurred within 4 days.    *Note: 79920 is for high complexity patients with a face to face visit within 7 days of discharge.  04153 is for high complexity patients with a face to face on days 8-14 post discharge or medium complexity with face to face visit within 14 days post discharge.

## 2018-11-09 ENCOUNTER — READMISSION MANAGEMENT (OUTPATIENT)
Dept: CALL CENTER | Facility: HOSPITAL | Age: 38
End: 2018-11-09

## 2018-11-09 NOTE — OUTREACH NOTE
Medical Week 2 Survey      Responses   Facility patient discharged from?  [place]   Does the patient have one of the following disease processes/diagnoses(primary or secondary)?  Other   Week 2 attempt successful?  Yes   Call start time  1300   Discharge diagnosis  hypoxia and pulm embolism   Call end time  1304   Meds reviewed with patient/caregiver?  Yes   Is the patient having any side effects they believe may be caused by any medication additions or changes?  No   Does the patient have all medications ordered at discharge?  Yes   Is the patient taking all medications as directed (includes completed medication regime)?  Yes   Does the patient have a primary care provider?   Yes   Does the patient have an appointment with their PCP within 7 days of discharge?  Yes   Has the patient kept scheduled appointments due by today?  Yes   Has home health visited the patient within 72 hours of discharge?  N/A   What DME was ordered?  O2, BIPAP/CPAP [facility]   Has all DME been delivered?  No   DME comments  Has not received CPAP yet. Company has been in contact with pt . Advised pt to call company if he does not receive by 11/12   Psychosocial issues?  No   Did the patient receive a copy of their discharge instructions?  Yes   Nursing interventions  Reviewed instructions with patient   What is the patient's perception of their health status since discharge?  Improving   Is the patient/caregiver able to teach back signs and symptoms related to disease process for when to call PCP?  Yes   Is the patient/caregiver able to teach back signs and symptoms related to disease process for when to call 911?  Yes   Additional teach back comments  non smoker. Uses oxygen at night and during day when he takes naps.    Week 2 Call Completed?  Yes          Rajni Downing RN

## 2018-11-15 ENCOUNTER — OFFICE VISIT (OUTPATIENT)
Dept: FAMILY MEDICINE CLINIC | Facility: CLINIC | Age: 38
End: 2018-11-15

## 2018-11-15 VITALS
HEART RATE: 94 BPM | RESPIRATION RATE: 18 BRPM | OXYGEN SATURATION: 96 % | HEIGHT: 71 IN | DIASTOLIC BLOOD PRESSURE: 85 MMHG | SYSTOLIC BLOOD PRESSURE: 118 MMHG | BODY MASS INDEX: 44.1 KG/M2 | WEIGHT: 315 LBS | TEMPERATURE: 97.9 F

## 2018-11-15 DIAGNOSIS — J01.00 ACUTE MAXILLARY SINUSITIS, RECURRENCE NOT SPECIFIED: Primary | ICD-10-CM

## 2018-11-15 PROCEDURE — 99213 OFFICE O/P EST LOW 20 MIN: CPT | Performed by: PHYSICIAN ASSISTANT

## 2018-11-15 RX ORDER — CEFDINIR 300 MG/1
300 CAPSULE ORAL 2 TIMES DAILY
Qty: 20 CAPSULE | Refills: 0 | Status: SHIPPED | OUTPATIENT
Start: 2018-11-15 | End: 2018-11-29

## 2018-11-15 RX ORDER — CEFDINIR 300 MG/1
300 CAPSULE ORAL 2 TIMES DAILY
Qty: 20 CAPSULE | Refills: 0 | Status: SHIPPED | OUTPATIENT
Start: 2018-11-15 | End: 2018-11-15 | Stop reason: SDUPTHER

## 2018-11-15 NOTE — PROGRESS NOTES
Subjective   Satish Will is a 38 y.o. male  Nasal Congestion (Runny nose, sinus congestion x1 day)      History of Present Illness  Sinus pressure congestion blowing green-yellow drainage from nose mild sore throat cough worse at night, mild sore throat pressure both right and left ears no nausea no vomiting nasal congestion blowing green-yellow drainage from nose no over-the-counter medication used  The following portions of the patient's history were reviewed and updated as appropriate: allergies, current medications, past social history and problem list    Review of Systems   Constitutional: Negative for chills, fatigue and fever.   HENT: Positive for congestion, ear pain, postnasal drip, rhinorrhea and sinus pressure. Negative for sore throat.    Eyes: Positive for pain.   Respiratory: Positive for cough. Negative for shortness of breath.    Neurological: Positive for headaches. Negative for dizziness.   Hematological: Negative for adenopathy.       Objective     Vitals:    11/15/18 1014   BP: 118/85   Pulse: 94   Resp: 18   Temp: 97.9 °F (36.6 °C)   SpO2: 96%       Physical Exam   Constitutional: He appears well-developed and well-nourished.   HENT:   Head: Normocephalic and atraumatic.   Right Ear: Tympanic membrane and ear canal normal.   Left Ear: Tympanic membrane and ear canal normal.   Nose: Mucosal edema, rhinorrhea and sinus tenderness present. Right sinus exhibits maxillary sinus tenderness and frontal sinus tenderness. Left sinus exhibits maxillary sinus tenderness and frontal sinus tenderness.   Mouth/Throat: Oropharynx is clear and moist. No oropharyngeal exudate.   Eyes: Pupils are equal, round, and reactive to light.   Cardiovascular: Normal rate and regular rhythm.   Pulmonary/Chest: Effort normal and breath sounds normal.   Nursing note and vitals reviewed.      Assessment/Plan     Diagnoses and all orders for this visit:    Acute maxillary sinusitis, recurrence not specified  -     cefdinir  (OMNICEF) 300 MG capsule; Take 1 capsule by mouth 2 (Two) Times a Day.    Other orders  -     Discontinue: cefdinir (OMNICEF) 300 MG capsule; Take 1 capsule by mouth 2 (Two) Times a Day.    Follow-up as needed

## 2018-11-29 ENCOUNTER — OFFICE VISIT (OUTPATIENT)
Dept: FAMILY MEDICINE CLINIC | Facility: CLINIC | Age: 38
End: 2018-11-29

## 2018-11-29 VITALS
RESPIRATION RATE: 16 BRPM | DIASTOLIC BLOOD PRESSURE: 85 MMHG | OXYGEN SATURATION: 98 % | HEIGHT: 71 IN | BODY MASS INDEX: 44.1 KG/M2 | SYSTOLIC BLOOD PRESSURE: 128 MMHG | HEART RATE: 89 BPM | WEIGHT: 315 LBS

## 2018-11-29 DIAGNOSIS — R07.9 CHEST PAIN, UNSPECIFIED TYPE: ICD-10-CM

## 2018-11-29 DIAGNOSIS — I10 ESSENTIAL HYPERTENSION: Primary | ICD-10-CM

## 2018-11-29 PROCEDURE — 99213 OFFICE O/P EST LOW 20 MIN: CPT | Performed by: PHYSICIAN ASSISTANT

## 2018-11-29 RX ORDER — BISOPROLOL FUMARATE 5 MG/1
5 TABLET, FILM COATED ORAL
Qty: 30 TABLET | Refills: 11 | Status: SHIPPED | OUTPATIENT
Start: 2018-11-29 | End: 2020-01-23 | Stop reason: SDUPTHER

## 2018-11-29 NOTE — PROGRESS NOTES
Subjective   Satish Will is a 38 y.o. male  Hypertension (RF bisoprolol) and DVT (RF Eliquis)      History of Present Illness  Patient pleasant 38-year-old white male comes in follow-up hypertension needs refill of DiaBeta also needs refill Eliquis for pulmonary embolism/DVT doing well no problems or complaints  The following portions of the patient's history were reviewed and updated as appropriate: allergies, current medications, past social history and problem list    Review of Systems   Constitutional: Negative for appetite change, diaphoresis, fatigue and unexpected weight change.   Eyes: Negative for visual disturbance.   Respiratory: Negative for cough, chest tightness and shortness of breath.    Cardiovascular: Negative for chest pain, palpitations and leg swelling.   Gastrointestinal: Negative for diarrhea, nausea and vomiting.   Endocrine: Negative for polydipsia, polyphagia and polyuria.   Skin: Negative for color change and rash.   Neurological: Negative for dizziness, syncope, weakness, light-headedness, numbness and headaches.       Objective     Vitals:    11/29/18 0955   BP: 128/85   Pulse: 89   Resp: 16   SpO2: 98%       Physical Exam   Constitutional: He appears well-developed and well-nourished.   Neck: Neck supple. No JVD present. No thyromegaly present.   Cardiovascular: Normal rate, regular rhythm, normal heart sounds, intact distal pulses and normal pulses.   No murmur heard.  Pulmonary/Chest: Effort normal and breath sounds normal. No respiratory distress.   Abdominal: Soft. Bowel sounds are normal. There is no hepatosplenomegaly. There is no tenderness.   Musculoskeletal: He exhibits no edema.   Lymphadenopathy:     He has no cervical adenopathy.   Neurological: No sensory deficit.   Skin: Skin is warm and dry. He is not diaphoretic.   Nursing note and vitals reviewed.      Assessment/Plan     Diagnoses and all orders for this visit:    Essential hypertension    Chest pain, unspecified  type  -     bisoprolol (ZEBeta) 5 MG tablet; Take 1 tablet by mouth Daily.    Other orders  -     apixaban (ELIQUIS) 5 MG tablet tablet; Take 1 tablet by mouth Every 12 (Twelve) Hours.    Follow-up if no better

## 2018-12-04 ENCOUNTER — APPOINTMENT (OUTPATIENT)
Dept: GENERAL RADIOLOGY | Facility: HOSPITAL | Age: 38
End: 2018-12-04

## 2018-12-04 ENCOUNTER — HOSPITAL ENCOUNTER (EMERGENCY)
Facility: HOSPITAL | Age: 38
Discharge: HOME | End: 2018-12-04
Attending: EMERGENCY MEDICINE | Admitting: EMERGENCY MEDICINE

## 2018-12-04 VITALS
DIASTOLIC BLOOD PRESSURE: 96 MMHG | HEART RATE: 95 BPM | TEMPERATURE: 97.8 F | WEIGHT: 315 LBS | SYSTOLIC BLOOD PRESSURE: 115 MMHG | BODY MASS INDEX: 44.1 KG/M2 | HEIGHT: 71 IN | RESPIRATION RATE: 16 BRPM | OXYGEN SATURATION: 95 %

## 2018-12-04 DIAGNOSIS — R07.89 CHEST WALL PAIN: Primary | ICD-10-CM

## 2018-12-04 LAB
ALBUMIN SERPL-MCNC: 4.48 G/DL (ref 3.2–4.8)
ALBUMIN/GLOB SERPL: 1.3 G/DL (ref 1.5–2.5)
ALP SERPL-CCNC: 60 U/L (ref 25–100)
ALT SERPL W P-5'-P-CCNC: 20 U/L (ref 7–40)
ANION GAP SERPL CALCULATED.3IONS-SCNC: 6 MMOL/L (ref 3–11)
AST SERPL-CCNC: 19 U/L (ref 0–33)
BASOPHILS # BLD AUTO: 0.01 10*3/MM3 (ref 0–0.2)
BASOPHILS NFR BLD AUTO: 0.1 % (ref 0–1)
BILIRUB SERPL-MCNC: 1.2 MG/DL (ref 0.3–1.2)
BNP SERPL-MCNC: 22 PG/ML (ref 0–100)
BUN BLD-MCNC: 13 MG/DL (ref 9–23)
BUN/CREAT SERPL: 12.7 (ref 7–25)
CALCIUM SPEC-SCNC: 9.3 MG/DL (ref 8.7–10.4)
CHLORIDE SERPL-SCNC: 103 MMOL/L (ref 99–109)
CO2 SERPL-SCNC: 29 MMOL/L (ref 20–31)
CREAT BLD-MCNC: 1.02 MG/DL (ref 0.6–1.3)
DEPRECATED RDW RBC AUTO: 44.5 FL (ref 37–54)
EOSINOPHIL # BLD AUTO: 0.15 10*3/MM3 (ref 0–0.3)
EOSINOPHIL NFR BLD AUTO: 1.9 % (ref 0–3)
ERYTHROCYTE [DISTWIDTH] IN BLOOD BY AUTOMATED COUNT: 15.1 % (ref 11.3–14.5)
GFR SERPL CREATININE-BSD FRML MDRD: 100 ML/MIN/1.73
GLOBULIN UR ELPH-MCNC: 3.5 GM/DL
GLUCOSE BLD-MCNC: 92 MG/DL (ref 70–100)
HCT VFR BLD AUTO: 53.2 % (ref 38.9–50.9)
HGB BLD-MCNC: 16.6 G/DL (ref 13.1–17.5)
HOLD SPECIMEN: NORMAL
HOLD SPECIMEN: NORMAL
IMM GRANULOCYTES # BLD: 0.02 10*3/MM3 (ref 0–0.03)
IMM GRANULOCYTES NFR BLD: 0.3 % (ref 0–0.6)
LIPASE SERPL-CCNC: 32 U/L (ref 6–51)
LYMPHOCYTES # BLD AUTO: 2.76 10*3/MM3 (ref 0.6–4.8)
LYMPHOCYTES NFR BLD AUTO: 34.9 % (ref 24–44)
MCH RBC QN AUTO: 25.1 PG (ref 27–31)
MCHC RBC AUTO-ENTMCNC: 31.2 G/DL (ref 32–36)
MCV RBC AUTO: 80.5 FL (ref 80–99)
MONOCYTES # BLD AUTO: 0.69 10*3/MM3 (ref 0–1)
MONOCYTES NFR BLD AUTO: 8.7 % (ref 0–12)
NEUTROPHILS # BLD AUTO: 4.28 10*3/MM3 (ref 1.5–8.3)
NEUTROPHILS NFR BLD AUTO: 54.1 % (ref 41–71)
PLATELET # BLD AUTO: 210 10*3/MM3 (ref 150–450)
PMV BLD AUTO: 9.4 FL (ref 6–12)
POTASSIUM BLD-SCNC: 4.2 MMOL/L (ref 3.5–5.5)
PROT SERPL-MCNC: 8 G/DL (ref 5.7–8.2)
RBC # BLD AUTO: 6.61 10*6/MM3 (ref 4.2–5.76)
SODIUM BLD-SCNC: 138 MMOL/L (ref 132–146)
TROPONIN I SERPL-MCNC: <0.006 NG/ML
WBC NRBC COR # BLD: 7.91 10*3/MM3 (ref 3.5–10.8)
WHOLE BLOOD HOLD SPECIMEN: NORMAL
WHOLE BLOOD HOLD SPECIMEN: NORMAL

## 2018-12-04 PROCEDURE — 85025 COMPLETE CBC W/AUTO DIFF WBC: CPT

## 2018-12-04 PROCEDURE — 84484 ASSAY OF TROPONIN QUANT: CPT | Performed by: EMERGENCY MEDICINE

## 2018-12-04 PROCEDURE — 71045 X-RAY EXAM CHEST 1 VIEW: CPT

## 2018-12-04 PROCEDURE — 80053 COMPREHEN METABOLIC PANEL: CPT | Performed by: EMERGENCY MEDICINE

## 2018-12-04 PROCEDURE — 93005 ELECTROCARDIOGRAM TRACING: CPT | Performed by: EMERGENCY MEDICINE

## 2018-12-04 PROCEDURE — 83690 ASSAY OF LIPASE: CPT | Performed by: EMERGENCY MEDICINE

## 2018-12-04 PROCEDURE — 93005 ELECTROCARDIOGRAM TRACING: CPT

## 2018-12-04 PROCEDURE — 83880 ASSAY OF NATRIURETIC PEPTIDE: CPT | Performed by: EMERGENCY MEDICINE

## 2018-12-04 PROCEDURE — 99283 EMERGENCY DEPT VISIT LOW MDM: CPT

## 2018-12-04 RX ORDER — ASPIRIN 81 MG/1
324 TABLET, CHEWABLE ORAL ONCE
Status: DISCONTINUED | OUTPATIENT
Start: 2018-12-04 | End: 2018-12-04 | Stop reason: HOSPADM

## 2018-12-04 RX ORDER — SODIUM CHLORIDE 0.9 % (FLUSH) 0.9 %
10 SYRINGE (ML) INJECTION AS NEEDED
Status: DISCONTINUED | OUTPATIENT
Start: 2018-12-04 | End: 2018-12-04

## 2018-12-04 NOTE — DISCHARGE INSTRUCTIONS
Continue your usual medications.    Be sure to recheck if you have prolonged pain, especially if having any shortness of breath.

## 2018-12-04 NOTE — ED PROVIDER NOTES
Subjective   Mr. Satish Will is a 38 y.o. male who presents to the ED with c/o CP with onset last night. He reports that he had DVT and PE back in October, but he has not had CP or SoA since then and has been taking his Eliquis regularly since then. He just returned to work 2 days ago and last night around 2000 he developed CP again. He was able to fall asleep with the pain but was woken up later in the night by worsened pain. He notes that his CP is sporadic and only lasts for seconds at a time. His pain is worse with movement and a cough but is not changed by deep breathing. He denies any SoA. He has a hx of HTN. No other acute complaints at this time.        History provided by:  Patient  Chest Pain   Pain severity:  Moderate  Duration:  15 hours  Timing:  Sporadic  Progression:  Worsening  Chronicity:  New  Worsened by:  Coughing and movement  Associated symptoms: cough (Mild)    Associated symptoms: no shortness of breath    Risk factors: hypertension        Review of Systems   Respiratory: Positive for cough (Mild). Negative for shortness of breath.    Cardiovascular: Positive for chest pain.   All other systems reviewed and are negative.      Past Medical History:   Diagnosis Date   • Deep vein thrombosis (CMS/HCC) 10/24/2018   • Hypertension    • Obesity    • Psoriasis    • Pulmonary embolism (CMS/HCC) 10/24/2018       No Known Allergies    History reviewed. No pertinent surgical history.    Family History   Problem Relation Age of Onset   • Hypertension Mother    • Hypertension Maternal Grandmother    • Heart disease Maternal Grandmother    • Cancer Paternal Grandmother        Social History     Socioeconomic History   • Marital status: Single     Spouse name: Not on file   • Number of children: Not on file   • Years of education: Not on file   • Highest education level: Not on file   Tobacco Use   • Smoking status: Never Smoker   • Smokeless tobacco: Never Used   Substance and Sexual Activity   • Alcohol  use: Yes     Comment: Drinks liquor weekly   • Drug use: No   • Sexual activity: Yes     Partners: Female         Objective   Physical Exam   Constitutional: He is oriented to person, place, and time. He appears well-developed and well-nourished. No distress.   Morbidly obese pleasant black male in no obvious distress.   HENT:   Head: Normocephalic and atraumatic.   Nose: Nose normal.   Eyes: Conjunctivae are normal. No scleral icterus.   Neck: Normal range of motion. Neck supple. No JVD present.   Cardiovascular: Normal rate and regular rhythm.   Pulmonary/Chest: Effort normal and breath sounds normal. No respiratory distress.   Abdominal: Soft. There is no tenderness.   Musculoskeletal: He exhibits no edema (No pitting pretibial edema).   Lymphadenopathy:     He has no cervical adenopathy.   Neurological: He is alert and oriented to person, place, and time.   Skin: Skin is warm and dry. He is not diaphoretic.   Psychiatric: He has a normal mood and affect. His behavior is normal.   Nursing note and vitals reviewed.      Procedures         ED Course       Recent Results (from the past 24 hour(s))   Comprehensive Metabolic Panel    Collection Time: 12/04/18 10:26 AM   Result Value Ref Range    Glucose 92 70 - 100 mg/dL    BUN 13 9 - 23 mg/dL    Creatinine 1.02 0.60 - 1.30 mg/dL    Sodium 138 132 - 146 mmol/L    Potassium 4.2 3.5 - 5.5 mmol/L    Chloride 103 99 - 109 mmol/L    CO2 29.0 20.0 - 31.0 mmol/L    Calcium 9.3 8.7 - 10.4 mg/dL    Total Protein 8.0 5.7 - 8.2 g/dL    Albumin 4.48 3.20 - 4.80 g/dL    ALT (SGPT) 20 7 - 40 U/L    AST (SGOT) 19 0 - 33 U/L    Alkaline Phosphatase 60 25 - 100 U/L    Total Bilirubin 1.2 0.3 - 1.2 mg/dL    eGFR  African Amer 100 >60 mL/min/1.73    Globulin 3.5 gm/dL    A/G Ratio 1.3 (L) 1.5 - 2.5 g/dL    BUN/Creatinine Ratio 12.7 7.0 - 25.0    Anion Gap 6.0 3.0 - 11.0 mmol/L   Lipase    Collection Time: 12/04/18 10:26 AM   Result Value Ref Range    Lipase 32 6 - 51 U/L   BNP     Collection Time: 12/04/18 10:26 AM   Result Value Ref Range    BNP 22.0 0.0 - 100.0 pg/mL   Light Blue Top    Collection Time: 12/04/18 10:26 AM   Result Value Ref Range    Extra Tube hold for add-on    Green Top (Gel)    Collection Time: 12/04/18 10:26 AM   Result Value Ref Range    Extra Tube Hold for add-ons.    Lavender Top    Collection Time: 12/04/18 10:26 AM   Result Value Ref Range    Extra Tube hold for add-on    Gold Top - SST    Collection Time: 12/04/18 10:26 AM   Result Value Ref Range    Extra Tube Hold for add-ons.    CBC Auto Differential    Collection Time: 12/04/18 10:26 AM   Result Value Ref Range    WBC 7.91 3.50 - 10.80 10*3/mm3    RBC 6.61 (H) 4.20 - 5.76 10*6/mm3    Hemoglobin 16.6 13.1 - 17.5 g/dL    Hematocrit 53.2 (H) 38.9 - 50.9 %    MCV 80.5 80.0 - 99.0 fL    MCH 25.1 (L) 27.0 - 31.0 pg    MCHC 31.2 (L) 32.0 - 36.0 g/dL    RDW 15.1 (H) 11.3 - 14.5 %    RDW-SD 44.5 37.0 - 54.0 fl    MPV 9.4 6.0 - 12.0 fL    Platelets 210 150 - 450 10*3/mm3    Neutrophil % 54.1 41.0 - 71.0 %    Lymphocyte % 34.9 24.0 - 44.0 %    Monocyte % 8.7 0.0 - 12.0 %    Eosinophil % 1.9 0.0 - 3.0 %    Basophil % 0.1 0.0 - 1.0 %    Immature Grans % 0.3 0.0 - 0.6 %    Neutrophils, Absolute 4.28 1.50 - 8.30 10*3/mm3    Lymphocytes, Absolute 2.76 0.60 - 4.80 10*3/mm3    Monocytes, Absolute 0.69 0.00 - 1.00 10*3/mm3    Eosinophils, Absolute 0.15 0.00 - 0.30 10*3/mm3    Basophils, Absolute 0.01 0.00 - 0.20 10*3/mm3    Immature Grans, Absolute 0.02 0.00 - 0.03 10*3/mm3   Troponin    Collection Time: 12/04/18 10:26 AM   Result Value Ref Range    Troponin I <0.006 <=0.039 ng/mL     Note: In addition to lab results from this visit, the labs listed above may include labs taken at another facility or during a different encounter within the last 24 hours. Please correlate lab times with ED admission and discharge times for further clarification of the services performed during this visit.    XR Chest 1 View   Preliminary Result  "  No acute cardiopulmonary process specifically no overt edema   or significant effusion.       D:  12/04/2018   E:  12/04/2018                Vitals:    12/04/18 1011 12/04/18 1143 12/04/18 1153   BP: 150/95 115/96    BP Location: Left arm     Patient Position: Sitting     Pulse: 95     Resp:   16   Temp:   97.8 °F (36.6 °C)   TempSrc:   Oral   SpO2: 95%     Weight: (!) 173 kg (380 lb 8 oz)     Height: 180.3 cm (71\")       Medications - No data to display  ECG/EMG Results (last 24 hours)     Procedure Component Value Units Date/Time    ECG 12 Lead [[id number]] Collected:  12/04/18 1017     Updated:  12/04/18 1100    Narrative:       Test Reason : chest pain  Blood Pressure : **/** mmHG  Vent. Rate : 090 BPM     Atrial Rate : 090 BPM     P-R Int : 152 ms          QRS Dur : 076 ms      QT Int : 372 ms       P-R-T Axes : 057 006 009 degrees     QTc Int : 455 ms    Normal sinus rhythm  Nonspecific T wave abnormality  Abnormal ECG  When compared with ECG of 24-OCT-2018 23:52,  T wave inversion less evident in Inferior leads  T wave inversion no longer evident in Lateral leads  Confirmed by MYRON SIDDIQUI MD ([id number]) on 12/4/2018 10:59:54 AM    Referred By:  ED MD           Confirmed By:MYRON SIDDIQUI MD                      [facility]    Final diagnoses:   Chest wall pain       Documentation assistance provided by haroon Benson.  Information recorded by the scribe was done at my direction and has been verified and validated by me.     Pat Benson  12/04/18 1112       Pat Benson  12/04/18 1138       Myron Siddiqui MD  12/04/18 2045    "

## 2018-12-19 ENCOUNTER — OFFICE VISIT (OUTPATIENT)
Dept: CARDIOLOGY | Facility: CLINIC | Age: 38
End: 2018-12-19

## 2018-12-19 VITALS
HEART RATE: 92 BPM | SYSTOLIC BLOOD PRESSURE: 124 MMHG | WEIGHT: 315 LBS | HEIGHT: 71 IN | BODY MASS INDEX: 44.1 KG/M2 | DIASTOLIC BLOOD PRESSURE: 80 MMHG | OXYGEN SATURATION: 98 %

## 2018-12-19 DIAGNOSIS — I26.99 BILATERAL PULMONARY EMBOLISM (HCC): ICD-10-CM

## 2018-12-19 DIAGNOSIS — I27.20 PULMONARY HYPERTENSION (HCC): Primary | ICD-10-CM

## 2018-12-19 PROCEDURE — 99214 OFFICE O/P EST MOD 30 MIN: CPT | Performed by: INTERNAL MEDICINE

## 2018-12-19 NOTE — PROGRESS NOTES
"Satish Will  1980  38 y.o.  [phone]      12/19/2018    Mckinley López PA    Chief Complaint   Patient presents with   • bilateral pulmonary embolism       Problem List:  1. Bilateral PE:  a. CTA Chest 10/25/18: Extensive bilateral central pulmonary emboli.  2. DVT:  a. BLE duplex: Acute RLE DVT common femoral; acute LLE DVT common/proximal femoral.  3. Dyspnea:  a. Echocardiogram, 10/26/2018: EF 55%. Mild concentric LVH. Mild TR with RVSP 76 mmHg.  4. RAFAEL recent dx without CPAP.  5. Elevated H/H.        No Known Allergies    Current Medications:      Current Outpatient Medications:   •  apixaban (ELIQUIS) 5 MG tablet tablet, Take 1 tablet by mouth Every 12 (Twelve) Hours., Disp: 60 tablet, Rfl: 1  •  bisoprolol (ZEBeta) 5 MG tablet, Take 1 tablet by mouth Daily., Disp: 30 tablet, Rfl: 11    HPI    Satish Will is a 38 y.o. male who presents today for 6 week hospital follow up of bilateral PE. Since last visit, he has been feeling better.  He's had no further complaints of shortness of breath or leg pains.  He states that he takes his Eliquis twice daily \"on most days\".  We emphasized the importance of taking this medication exactly as directed to remain protected from potential future issues.  We also told him that he likely would be on this medication lifelong; he was very shocked by this.  He questioned how much alcohol he was \"allowed to drink\".  We told him a drink or 2 daily was ok, but recommended no more.  He asked if he could \"stockpile that amount\" and have them all on the weekends.  His labwork did not indicate any clotting disorders and we have no true etiology of why this happened.  We gave him the option of seeing a Hematologist for further evaluation as he does not seem overly interested in remaining on anticoagulation lifelong.  He is agreeable to do so.  He denies chest pain, palpitations, shortness of breath, dyspnea on exertion, worsening edema, dizziness, and syncope. " "    The following portions of the patient's history were reviewed and updated as appropriate: allergies, current medications and problem list.    Pertinent positives as listed in the HPI.  All other systems reviewed are negative.    Vitals:    12/19/18 1610   BP: 124/80   BP Location: Right arm   Patient Position: Sitting   Pulse: 92   SpO2: 98%   Weight: (!) 174 kg (384 lb)   Height: 180.3 cm (71\")       Physical Exam:    GENERAL: well-developed, well-nourished; in no acute distress.   NECK:  Carotid upstrokes are 2+ and  symmetrical without bruits.   LUNGS: Clear to auscultation bilaterally without wheezing, rhonchi, or rales noted.   CARDIOVASCULAR: The heart has a regular rate with a normal S1 and S2. There is no murmur, gallop, rub, or click appreciated. The PMI is nondisplaced.   NEUROLOGICAL: Nonfocal; Alert and oriented  PERIPHERAL VASCULAR:  There is trace peripheral edema.   SKIN:  Warm and dry  PSYCHIATRIC: normal mood and affect; behavior appropriate    Diagnostic Data:  Lab Results   Component Value Date    GLUCOSE 92 12/04/2018    BUN 13 12/04/2018    CREATININE 1.02 12/04/2018    EGFRIFAFRI 100 12/04/2018    BCR 12.7 12/04/2018    K 4.2 12/04/2018    CO2 29.0 12/04/2018    CALCIUM 9.3 12/04/2018    ALBUMIN 4.48 12/04/2018    AST 19 12/04/2018    ALT 20 12/04/2018     Lab Results   Component Value Date    WBC 7.91 12/04/2018    HGB 16.6 12/04/2018    HCT 53.2 (H) 12/04/2018    MCV 80.5 12/04/2018     12/04/2018       Procedures    Assessment:      ICD-10-CM ICD-9-CM   1. Pulmonary hypertension (CMS/HCC) I27.20 416.8   2. Bilateral pulmonary embolism (CMS/HCC) I26.99 415.19       Plan:    1. Eliquis must be taken twice daily; concerned with having to take this lifelong  2. Referral to hematologist   3. Repeat echocardiogram by the end of the year  4. Continue current medications.  5. F/up in 6 months or sooner if needed.    Scribed for Hilary Websetr MD by Geno Mijares, YAW. 12/21/2018  " 11:57 AM     I Hilary Webster MD personally performed the services described in this documentation as scribed by the above individual in my presence, and it is both accurate and complete.    Hilary Webster MD, FACC

## 2018-12-28 DIAGNOSIS — I26.99 OTHER PULMONARY EMBOLISM WITHOUT ACUTE COR PULMONALE, UNSPECIFIED CHRONICITY (HCC): Primary | ICD-10-CM

## 2018-12-28 DIAGNOSIS — O22.30 DVT (DEEP VEIN THROMBOSIS) IN PREGNANCY: ICD-10-CM

## 2019-01-25 ENCOUNTER — APPOINTMENT (OUTPATIENT)
Dept: CARDIOLOGY | Facility: HOSPITAL | Age: 39
End: 2019-01-25

## 2019-02-08 ENCOUNTER — TELEPHONE (OUTPATIENT)
Dept: FAMILY MEDICINE CLINIC | Facility: CLINIC | Age: 39
End: 2019-02-08

## 2019-02-08 NOTE — TELEPHONE ENCOUNTER
----- Message from Romelia Dillon sent at 2/8/2019  1:20 PM EST -----  Contact: PT.  PT. SEE'S SALUD.  PT. HAS A F/U APPT. @ END OF THIS MONTH.  PT. IS CURRENTLY OUT OF: apixaban (ELIQUIS) 5 MG tablet tablet 60 tablet 1 11/29/2018    Sig - Route: Take 1 tablet by mouth Every 12 (Twelve) Hours. - Oral   Sent to pharmacy as: Apixaban 5 MG Oral Tablet.  NEEDING A REFILL.    RX=[facility] - [address] - [phone]  - [phone] (Phone)  [phone] (Fax).    PT. CAN BE REACHED @ [facility] #.

## 2019-02-28 ENCOUNTER — OFFICE VISIT (OUTPATIENT)
Dept: FAMILY MEDICINE CLINIC | Facility: CLINIC | Age: 39
End: 2019-02-28

## 2019-02-28 VITALS
TEMPERATURE: 97.8 F | DIASTOLIC BLOOD PRESSURE: 82 MMHG | HEIGHT: 71 IN | SYSTOLIC BLOOD PRESSURE: 138 MMHG | BODY MASS INDEX: 44.1 KG/M2 | WEIGHT: 315 LBS | HEART RATE: 76 BPM | OXYGEN SATURATION: 99 %

## 2019-02-28 DIAGNOSIS — L30.9 ECZEMA, UNSPECIFIED TYPE: Primary | ICD-10-CM

## 2019-02-28 DIAGNOSIS — I10 ESSENTIAL HYPERTENSION: ICD-10-CM

## 2019-02-28 PROCEDURE — 99214 OFFICE O/P EST MOD 30 MIN: CPT | Performed by: PHYSICIAN ASSISTANT

## 2019-02-28 RX ORDER — BETAMETHASONE DIPROPIONATE 0.5 MG/G
CREAM TOPICAL 2 TIMES DAILY
Qty: 50 G | Refills: 11 | Status: SHIPPED | OUTPATIENT
Start: 2019-02-28 | End: 2019-11-15

## 2019-02-28 NOTE — PROGRESS NOTES
Subjective   Satish Will is a 38 y.o. male  Hypertension (3 month follow up on blood pressure )      History of Present Illness  Patient is a pleasant 38-year-old white male who comes in plan of rash on right and left arms, hands patient states that he has been diagnosed with eczema in the past says over the last couple of weeks scaly itchy rash is gotten worse, patient denies any use of new products has been using over-the-counter medication he states skin is itching irritated new problem        Patient comes in for follow-up of hypertension patient is on beta is working well he has gained some weight he is try to watch his diet and exercise no shortness breath no chest pain no nausea vomiting symptoms are better chronic problem  The following portions of the patient's history were reviewed and updated as appropriate: allergies, current medications, past social history and problem list    Review of Systems   Constitutional: Negative for appetite change, diaphoresis, fatigue and unexpected weight change.   Eyes: Negative for visual disturbance.   Respiratory: Negative for cough, chest tightness and shortness of breath.    Cardiovascular: Negative for chest pain, palpitations and leg swelling.   Gastrointestinal: Negative for diarrhea, nausea and vomiting.   Endocrine: Negative for polydipsia, polyphagia and polyuria.   Skin: Positive for color change. Negative for rash.        Erythemic rash on both right and left hands consistent with eczema   Neurological: Negative for dizziness, syncope, weakness, light-headedness, numbness and headaches.       Objective     Vitals:    02/28/19 0803   BP: 138/82   Pulse: 76   Temp: 97.8 °F (36.6 °C)   SpO2: 99%       Physical Exam   Constitutional: He appears well-developed and well-nourished.   Neck: Neck supple. No JVD present. No thyromegaly present.   Cardiovascular: Normal rate, regular rhythm, normal heart sounds, intact distal pulses and normal pulses.   No murmur  heard.  Pulmonary/Chest: Effort normal and breath sounds normal. No respiratory distress.   Abdominal: Soft. Bowel sounds are normal. There is no hepatosplenomegaly. There is no tenderness.   Musculoskeletal: He exhibits no edema.   Lymphadenopathy:     He has no cervical adenopathy.   Neurological: No sensory deficit.   Skin: Skin is warm and dry. He is not diaphoretic.        Nursing note and vitals reviewed.      Assessment/Plan     Diagnoses and all orders for this visit:    Eczema, unspecified type  -     betamethasone, augmented, (DIPROLENE AF) 0.05 % cream; Apply  topically to the appropriate area as directed 2 (Two) Times a Day.    Essential hypertension    Continue ZIBETA mg 1 p.o. every day, long discussion with patient about weight loss low-carb low-calorie diet exercise 3-5 times per week for 30 minutes stable weight loss goal of 10% of his body weight which is approximately 30 pounds

## 2019-03-07 ENCOUNTER — TELEPHONE (OUTPATIENT)
Dept: FAMILY MEDICINE CLINIC | Facility: CLINIC | Age: 39
End: 2019-03-07

## 2019-03-07 NOTE — TELEPHONE ENCOUNTER
Spoke with patient he said that he started to not feel well late Sunday and Early Monday. He couldn't get in to see you and went to a [facility] center. They said that they didn't have a kit to test him for the flu and instead just gave him a nasal decongestant and put him off the rest of the week. He cannot just bring in a note from the provider due to his work needing more like FMLA. He stated that the clinic he went to won't do FMLA papers and said that he needed to go to his PCP to have them fill out. Please advise.     He went to the [facility].

## 2019-03-07 NOTE — TELEPHONE ENCOUNTER
----- Message from Romelia Dillon sent at 3/7/2019  8:04 AM EST -----  Contact: PT.  PT. SEE'S SALUD.  PT. HAS AN APPT. ON: 03- W/PROVIDER.  PT. STATED WAS SEEN @ HIS [facility] F[facility]; HE WAS TOLD THAT MAY HAVE FLU.    PT. IS WANTING TO TALK W/NATY ANDRADE ABOVE & OTHER ISSUES.    PT. CAN BE REACHED @ [facility] #.

## 2019-03-07 NOTE — TELEPHONE ENCOUNTER
Patient is going to bring paperwork by tomorrow and I asked him to bring his office note if possible.

## 2019-05-31 ENCOUNTER — OFFICE VISIT (OUTPATIENT)
Dept: CARDIOLOGY | Facility: CLINIC | Age: 39
End: 2019-05-31

## 2019-05-31 VITALS
DIASTOLIC BLOOD PRESSURE: 92 MMHG | HEIGHT: 71 IN | BODY MASS INDEX: 44.1 KG/M2 | SYSTOLIC BLOOD PRESSURE: 144 MMHG | WEIGHT: 315 LBS | HEART RATE: 89 BPM

## 2019-05-31 DIAGNOSIS — I82.413 ACUTE BILATERAL DEEP VEIN THROMBOSIS (DVT) OF FEMORAL VEINS (HCC): ICD-10-CM

## 2019-05-31 DIAGNOSIS — I26.99 BILATERAL PULMONARY EMBOLISM (HCC): Primary | ICD-10-CM

## 2019-05-31 PROCEDURE — 99213 OFFICE O/P EST LOW 20 MIN: CPT | Performed by: NURSE PRACTITIONER

## 2019-05-31 NOTE — PROGRESS NOTES
Satish Will  1980  [phone]  [phone]    05/31/2019    Mckinley López PA    Chief Complaint   Patient presents with   • Pulmonary hypertension (CMS/HCC)       No Known Allergies    Current Medications    Current Outpatient Medications:   •  apixaban (ELIQUIS) 5 MG tablet tablet, Take 1 tablet by mouth Every 12 (Twelve) Hours., Disp: 60 tablet, Rfl: 3  •  betamethasone, augmented, (DIPROLENE AF) 0.05 % cream, Apply  topically to the appropriate area as directed 2 (Two) Times a Day., Disp: 50 g, Rfl: 11  •  bisoprolol (ZEBeta) 5 MG tablet, Take 1 tablet by mouth Daily., Disp: 30 tablet, Rfl: 11    History of Present Illness   HPI  Patient is a pleasant 39-year-old -American male with the above-noted medical history.  He presents today for six-month follow-up of his known history of bilateral pulmonary embolisms and bilateral lower extremity DVTs.  He is remained on his Eliquis faithfully without missing any doses.  He did go and see hematology but due to the amount of money required upfront from his insurance, he opted to to stick with the Eliquis long-term.  He is not experiencing any chest pain, shortness of breath, dyspnea on exertion, edema, fatigue, palpitations, dizziness and syncope.  His blood pressure is running a little higher today than usual as is his heart rate.  He notes that he did not take his bisoprolol this morning.  I advised him that it is important to stay on all the medications that are prescribed to him.  He verbalized understanding.  It is of note that he is down 13 to 14 pounds from his previous weight noted in February 2019.  I told him to keep up the good work.      The following portions of the patient's history were reviewed and updated as appropriate: allergies, current medications and problem list.    Pertinent positives as listed in the HPI.  All other systems reviewed are negative.    Vitals:    05/31/19 1450   BP: 144/92   BP Location: Left arm  "  Patient Position: Sitting   Pulse: 89   Weight: (!) 167 kg (369 lb)   Height: 180.3 cm (71\")       Physical Exam  GENERAL: well-developed, well-nourished; in no acute distress.   NECK:  Carotid upstrokes are 2+ and  symmetrical without bruits.   LUNGS: Clear to auscultation bilaterally without wheezing, rhonchi, or rales noted.   CARDIOVASCULAR: The heart has a regular rate with a normal S1 and S2. There is no murmur, gallop, rub, or click appreciated. The PMI is nondisplaced.   NEUROLOGICAL: Nonfocal; Alert and oriented  PERIPHERAL VASCULAR:  Posterior tibial pulses are 2+ and symmetrical. There is no peripheral edema.   SKIN:  Warm and dry  PSYCHIATRIC: normal mood and affect; behavior appropriate    Diagnostic Data:  Procedures    Assessment:      ICD-10-CM ICD-9-CM   1. Bilateral pulmonary embolism (CMS/HCC) - resolved I26.99 415.19   2. Acute bilateral deep vein thrombosis (DVT) of femoral veins (CMS/HCC)- resolved I82.413 453.41       Plan:  Encourage routine exercise and dietary modifications  Continue Eliquis 5 mg twice daily lifelong  Continue bisoprolol 5 mg daily for blood pressure and heart rate control  F/up in 12 months or sooner if needed.      Seen independently by YAW Moore on May 31, 2019 at 1500      "

## 2019-06-03 ENCOUNTER — TELEPHONE (OUTPATIENT)
Dept: FAMILY MEDICINE CLINIC | Facility: CLINIC | Age: 39
End: 2019-06-03

## 2019-06-03 NOTE — TELEPHONE ENCOUNTER
----- Message from Latha Rasmussen sent at 6/3/2019  3:35 PM EDT -----  Contact: patient  Patient needs a note say these things specifically  -cleared to return to full duty  -on the date of 6/4/2019  -signed and dated by Dr. López    Fax to: [phone] to the attention of Melodie Amaya

## 2019-06-28 ENCOUNTER — TELEPHONE (OUTPATIENT)
Dept: FAMILY MEDICINE CLINIC | Facility: CLINIC | Age: 39
End: 2019-06-28

## 2019-06-28 NOTE — TELEPHONE ENCOUNTER
----- Message from Latha Smyth sent at 6/28/2019 10:44 AM EDT -----  Contact: PATIENT  PATIENT CALLED FOR MED REFILL apixaban (ELIQUIS) 5 MG tablet tablet   [facility]   CALL BACK NUMBER [phone]

## 2019-07-29 ENCOUNTER — TELEPHONE (OUTPATIENT)
Dept: FAMILY MEDICINE CLINIC | Facility: CLINIC | Age: 39
End: 2019-07-29

## 2019-07-29 NOTE — TELEPHONE ENCOUNTER
----- Message from Latha Crouch sent at 7/29/2019  1:07 PM EDT -----  Contact: PATIENT  PATIENT WANTS A REFILL OF HIS BLOOD THINNER MEDICATION.  HE WOULD ALSO LIKE A 90 SUPPLY INSTEAD OF A 30 DAY SUPPLY THAT HE CURRENTLY HAS.    apixaban (ELIQUIS) 5 MG tablet tablet 60 tablet 3     Sig - Route: Take 1 tablet by mouth Every 12 (Twelve) Hours. - Oral   Sent to pharmacy as: Apixaban 5 MG Oral Tablet   E-Prescribing Status: Receipt confirmed by pharmacy (6/28/2019 10:49 AM EDT)   Pharmacy     [facility] - [place], [place] - [address] [place] [address] [facility] [address] - [phone]  - [phone] FX

## 2019-09-20 ENCOUNTER — OFFICE VISIT (OUTPATIENT)
Dept: FAMILY MEDICINE CLINIC | Facility: CLINIC | Age: 39
End: 2019-09-20

## 2019-09-20 VITALS
HEART RATE: 79 BPM | OXYGEN SATURATION: 98 % | HEIGHT: 71 IN | SYSTOLIC BLOOD PRESSURE: 134 MMHG | BODY MASS INDEX: 44.1 KG/M2 | RESPIRATION RATE: 16 BRPM | DIASTOLIC BLOOD PRESSURE: 86 MMHG | WEIGHT: 315 LBS

## 2019-09-20 DIAGNOSIS — K42.0 UMBILICAL HERNIA WITH OBSTRUCTION, WITHOUT GANGRENE: ICD-10-CM

## 2019-09-20 DIAGNOSIS — R21 RASH: Primary | ICD-10-CM

## 2019-09-20 PROCEDURE — 99213 OFFICE O/P EST LOW 20 MIN: CPT | Performed by: PHYSICIAN ASSISTANT

## 2019-09-20 RX ORDER — CLOTRIMAZOLE AND BETAMETHASONE DIPROPIONATE 10; .64 MG/G; MG/G
CREAM TOPICAL 2 TIMES DAILY
Qty: 15 G | Refills: 1 | Status: SHIPPED | OUTPATIENT
Start: 2019-09-20 | End: 2019-11-15

## 2019-09-20 RX ORDER — CEPHALEXIN 500 MG/1
500 CAPSULE ORAL 3 TIMES DAILY
Qty: 30 CAPSULE | Refills: 1 | Status: SHIPPED | OUTPATIENT
Start: 2019-09-20 | End: 2019-11-15

## 2019-09-20 NOTE — PROGRESS NOTES
Subjective   Satish Will is a 39 y.o. male  Skin problem (Dry skin on Lt ankle, applying lotion but not improving, painful)      History of Present Illness  Patient is a 39-year-old white male who comes in plan of rash o on the left ankle a, rash erythemic area left ankle patient's rash is worse last 48 hours patient complains of knot in abdomen patient states area is tender to palpation pay states area is swollen tender and abdomen  The following portions of the patient's history were reviewed and updated as appropriate: allergies, current medications, past social history and problem list    Review of Systems   Constitutional: Negative for fatigue and unexpected weight change.   Respiratory: Negative for cough, chest tightness and shortness of breath.    Cardiovascular: Negative for chest pain, palpitations and leg swelling.   Gastrointestinal: Negative for nausea.   Skin: Negative for color change and rash.        Erythemic rash   Neurological: Negative for dizziness, syncope, weakness and headaches.       Objective     Vitals:    09/20/19 1509   BP: 134/86   Pulse: 79   Resp: 16   SpO2: 98%       Physical Exam   Constitutional: He appears well-developed and well-nourished.   Neck: Neck supple. No JVD present. No thyromegaly present.   Cardiovascular: Normal rate, regular rhythm, normal heart sounds, intact distal pulses and normal pulses.   No murmur heard.  Pulmonary/Chest: Effort normal and breath sounds normal. No respiratory distress.   Abdominal: Soft. Bowel sounds are normal. There is no hepatosplenomegaly. There is no tenderness.       Musculoskeletal: He exhibits no edema.   Lymphadenopathy:     He has no cervical adenopathy.   Neurological: No sensory deficit.   Skin: Skin is warm and dry. He is not diaphoretic.   Nursing note and vitals reviewed.      Assessment/Plan     Diagnoses and all orders for this visit:    Rash  -     clotrimazole-betamethasone (LOTRISONE) 1-0.05 % cream; Apply  topically to  the appropriate area as directed 2 (Two) Times a Day.  -     cephalexin (KEFLEX) 500 MG capsule; Take 1 capsule by mouth 3 (Three) Times a Day.    Umbilical hernia with obstruction, without gangrene  -     Ambulatory Referral to General Surgery    Follow-up as needed

## 2019-09-23 NOTE — PROGRESS NOTES
I have reviewed the notes, assessments, and/or procedures performed by Darrell López PA-C, I concur with his documentation of Satish Will.

## 2019-11-04 ENCOUNTER — TELEPHONE (OUTPATIENT)
Dept: FAMILY MEDICINE CLINIC | Facility: CLINIC | Age: 39
End: 2019-11-04

## 2019-11-04 NOTE — TELEPHONE ENCOUNTER
PTS MOTHER CALLED AND STATED THE PPW SHE HAS DROPPED OFF NEEDS TO BE READY BY TODAY; PLEASE ADVISE AND CALL MOTHER WHEN READY FOR

## 2019-11-04 NOTE — TELEPHONE ENCOUNTER
MOM DROPPED OFF SOME PAPERWORK FOR SALUD TO COMPLETE, SHE WANTED TO KNOW THE STATUS OF THAT; PLEASE CALL HER BACK. THANK YOU

## 2019-11-05 NOTE — TELEPHONE ENCOUNTER
I spoke with patient's mother to make sure that this paperwork was regarding his PE from October 2018. She verified that it is and I told her that paperwork can be picked up today.

## 2019-11-15 ENCOUNTER — APPOINTMENT (OUTPATIENT)
Dept: PREADMISSION TESTING | Facility: HOSPITAL | Age: 39
End: 2019-11-15

## 2019-11-15 ENCOUNTER — TELEPHONE (OUTPATIENT)
Dept: CARDIOLOGY | Facility: CLINIC | Age: 39
End: 2019-11-15

## 2019-11-15 VITALS — HEIGHT: 71 IN | WEIGHT: 315 LBS | BODY MASS INDEX: 44.1 KG/M2

## 2019-11-15 LAB
ANION GAP SERPL CALCULATED.3IONS-SCNC: 11 MMOL/L (ref 5–15)
BUN BLD-MCNC: 14 MG/DL (ref 6–20)
BUN/CREAT SERPL: 12.4 (ref 7–25)
CALCIUM SPEC-SCNC: 9.3 MG/DL (ref 8.6–10.5)
CHLORIDE SERPL-SCNC: 101 MMOL/L (ref 98–107)
CO2 SERPL-SCNC: 28 MMOL/L (ref 22–29)
CREAT BLD-MCNC: 1.13 MG/DL (ref 0.76–1.27)
DEPRECATED RDW RBC AUTO: 41 FL (ref 37–54)
ERYTHROCYTE [DISTWIDTH] IN BLOOD BY AUTOMATED COUNT: 13.2 % (ref 12.3–15.4)
GFR SERPL CREATININE-BSD FRML MDRD: 88 ML/MIN/1.73
GLUCOSE BLD-MCNC: 101 MG/DL (ref 65–99)
HCT VFR BLD AUTO: 50.2 % (ref 37.5–51)
HGB BLD-MCNC: 15.8 G/DL (ref 13–17.7)
MCH RBC QN AUTO: 27.2 PG (ref 26.6–33)
MCHC RBC AUTO-ENTMCNC: 31.5 G/DL (ref 31.5–35.7)
MCV RBC AUTO: 86.4 FL (ref 79–97)
PLATELET # BLD AUTO: 275 10*3/MM3 (ref 140–450)
PMV BLD AUTO: 9 FL (ref 6–12)
POTASSIUM BLD-SCNC: 4.4 MMOL/L (ref 3.5–5.2)
RBC # BLD AUTO: 5.81 10*6/MM3 (ref 4.14–5.8)
SODIUM BLD-SCNC: 140 MMOL/L (ref 136–145)
WBC NRBC COR # BLD: 7.23 10*3/MM3 (ref 3.4–10.8)

## 2019-11-15 PROCEDURE — 93010 ELECTROCARDIOGRAM REPORT: CPT | Performed by: INTERNAL MEDICINE

## 2019-11-15 PROCEDURE — 93005 ELECTROCARDIOGRAM TRACING: CPT

## 2019-11-15 PROCEDURE — 36415 COLL VENOUS BLD VENIPUNCTURE: CPT

## 2019-11-15 PROCEDURE — 80048 BASIC METABOLIC PNL TOTAL CA: CPT | Performed by: SURGERY

## 2019-11-15 PROCEDURE — 85027 COMPLETE CBC AUTOMATED: CPT | Performed by: SURGERY

## 2019-11-15 NOTE — TELEPHONE ENCOUNTER
Tried to call pt but there was no answer and no VM to leave a message- did leave a msg on his emergency contact VM to have him call me to discuss bridging -    No lovenox/eliquis day of surgery 11/20, loveonox in the monrning 11/19. Lovenox BID  11/18 and 11/17. Last dose of eliquis should be this evening-     I have not been able to reach the PT to give him this info- I did relay to Shanda in [facility] and a script was called into [facility]       lovenox should 1mg/kg

## 2019-11-15 NOTE — TELEPHONE ENCOUNTER
"PT is scheduled for umbilical hernia repair with Dr. Merino next week- he is on Eliquis for NANETTE pulmonary emboli and DVT- no CT scan since October 2018 (that I am aware of)  they are requesting \"ok\" to hold Eliquis prior to procedure-     Any need to bridge with Lovenox? CT scan? He is not scheduled to see us until June 2020  "

## 2019-11-15 NOTE — PAT
Patient to apply Chlorhexadine wipes  to surgical area (as instructed) the night before procedure and the AM of procedure. Wipes provided.    Yazmin in dr bowles's office notified of contact with amalia lucas for milagro and ok to stop eliquis. Yazmin stated she would notify dr bowles and have him talk with cardiologist and plan. Ally to notify patient with instructions. Patient aware

## 2019-11-19 ENCOUNTER — ANESTHESIA EVENT (OUTPATIENT)
Dept: PERIOP | Facility: HOSPITAL | Age: 39
End: 2019-11-19

## 2019-11-19 RX ORDER — FAMOTIDINE 10 MG/ML
20 INJECTION, SOLUTION INTRAVENOUS ONCE
Status: CANCELLED | OUTPATIENT
Start: 2019-11-19 | End: 2019-11-19

## 2019-11-20 ENCOUNTER — HOSPITAL ENCOUNTER (OUTPATIENT)
Facility: HOSPITAL | Age: 39
Discharge: HOME | End: 2019-11-21
Attending: SURGERY | Admitting: SURGERY

## 2019-11-20 ENCOUNTER — ANESTHESIA (OUTPATIENT)
Dept: PERIOP | Facility: HOSPITAL | Age: 39
End: 2019-11-20

## 2019-11-20 PROBLEM — K43.9 VENTRAL HERNIA: Status: ACTIVE | Noted: 2019-11-20

## 2019-11-20 PROCEDURE — C1781 MESH (IMPLANTABLE): HCPCS | Performed by: SURGERY

## 2019-11-20 PROCEDURE — 25010000002 NEOSTIGMINE 10 MG/10ML SOLUTION: Performed by: NURSE ANESTHETIST, CERTIFIED REGISTERED

## 2019-11-20 PROCEDURE — G0378 HOSPITAL OBSERVATION PER HR: HCPCS

## 2019-11-20 PROCEDURE — 25010000002 HEPARIN (PORCINE) PER 1000 UNITS: Performed by: NURSE ANESTHETIST, CERTIFIED REGISTERED

## 2019-11-20 PROCEDURE — 25010000002 DEXAMETHASONE SODIUM PHOSPHATE 10 MG/ML SOLUTION: Performed by: NURSE ANESTHETIST, CERTIFIED REGISTERED

## 2019-11-20 PROCEDURE — 25010000002 BUPRENORPHINE PER 0.1 MG: Performed by: NURSE ANESTHETIST, CERTIFIED REGISTERED

## 2019-11-20 PROCEDURE — 25010000002 HEPARIN (PORCINE) PER 1000 UNITS: Performed by: SURGERY

## 2019-11-20 PROCEDURE — 25010000002 PROPOFOL 10 MG/ML EMULSION: Performed by: NURSE ANESTHETIST, CERTIFIED REGISTERED

## 2019-11-20 PROCEDURE — 25010000002 DEXAMETHASONE PER 1 MG: Performed by: NURSE ANESTHETIST, CERTIFIED REGISTERED

## 2019-11-20 PROCEDURE — 25010000002 HYDROMORPHONE PER 4 MG: Performed by: NURSE ANESTHETIST, CERTIFIED REGISTERED

## 2019-11-20 PROCEDURE — 25010000002 ONDANSETRON PER 1 MG: Performed by: NURSE ANESTHETIST, CERTIFIED REGISTERED

## 2019-11-20 PROCEDURE — 25010000003 CEFAZOLIN 1-4 GM/50ML-% SOLUTION: Performed by: SURGERY

## 2019-11-20 PROCEDURE — 25010000002 FENTANYL CITRATE (PF) 100 MCG/2ML SOLUTION: Performed by: NURSE ANESTHETIST, CERTIFIED REGISTERED

## 2019-11-20 DEVICE — VENTRALIGHT ST MESH
Type: IMPLANTABLE DEVICE | Site: ABDOMEN | Status: FUNCTIONAL
Brand: VENTRALIGHT ST

## 2019-11-20 RX ORDER — FENTANYL CITRATE 50 UG/ML
50 INJECTION, SOLUTION INTRAMUSCULAR; INTRAVENOUS
Status: DISCONTINUED | OUTPATIENT
Start: 2019-11-20 | End: 2019-11-20 | Stop reason: HOSPADM

## 2019-11-20 RX ORDER — SODIUM CHLORIDE 0.9 % (FLUSH) 0.9 %
3 SYRINGE (ML) INJECTION EVERY 12 HOURS SCHEDULED
Status: DISCONTINUED | OUTPATIENT
Start: 2019-11-20 | End: 2019-11-20 | Stop reason: HOSPADM

## 2019-11-20 RX ORDER — DEXAMETHASONE SODIUM PHOSPHATE 10 MG/ML
INJECTION, SOLUTION INTRAMUSCULAR; INTRAVENOUS
Status: DISCONTINUED | OUTPATIENT
Start: 2019-11-20 | End: 2019-11-20

## 2019-11-20 RX ORDER — NALOXONE HCL 0.4 MG/ML
0.4 VIAL (ML) INJECTION
Status: DISCONTINUED | OUTPATIENT
Start: 2019-11-20 | End: 2019-11-21 | Stop reason: HOSPADM

## 2019-11-20 RX ORDER — BUPRENORPHINE HYDROCHLORIDE 0.32 MG/ML
INJECTION INTRAMUSCULAR; INTRAVENOUS AS NEEDED
Status: DISCONTINUED | OUTPATIENT
Start: 2019-11-20 | End: 2019-11-20 | Stop reason: SURG

## 2019-11-20 RX ORDER — SODIUM CHLORIDE, SODIUM LACTATE, POTASSIUM CHLORIDE, CALCIUM CHLORIDE 600; 310; 30; 20 MG/100ML; MG/100ML; MG/100ML; MG/100ML
50 INJECTION, SOLUTION INTRAVENOUS CONTINUOUS
Status: DISCONTINUED | OUTPATIENT
Start: 2019-11-20 | End: 2019-11-21 | Stop reason: HOSPADM

## 2019-11-20 RX ORDER — SODIUM CHLORIDE, SODIUM LACTATE, POTASSIUM CHLORIDE, CALCIUM CHLORIDE 600; 310; 30; 20 MG/100ML; MG/100ML; MG/100ML; MG/100ML
9 INJECTION, SOLUTION INTRAVENOUS CONTINUOUS
Status: DISCONTINUED | OUTPATIENT
Start: 2019-11-20 | End: 2019-11-21 | Stop reason: HOSPADM

## 2019-11-20 RX ORDER — BUPRENORPHINE HYDROCHLORIDE 0.32 MG/ML
INJECTION INTRAMUSCULAR; INTRAVENOUS
Status: DISCONTINUED | OUTPATIENT
Start: 2019-11-20 | End: 2019-11-20

## 2019-11-20 RX ORDER — MORPHINE SULFATE 2 MG/ML
8 INJECTION, SOLUTION INTRAMUSCULAR; INTRAVENOUS
Status: DISCONTINUED | OUTPATIENT
Start: 2019-11-20 | End: 2019-11-21 | Stop reason: HOSPADM

## 2019-11-20 RX ORDER — IBUPROFEN 800 MG/1
800 TABLET ORAL EVERY 8 HOURS PRN
Status: DISCONTINUED | OUTPATIENT
Start: 2019-11-20 | End: 2019-11-21 | Stop reason: HOSPADM

## 2019-11-20 RX ORDER — CEFAZOLIN SODIUM IN 0.9 % NACL 3 G/100 ML
3 INTRAVENOUS SOLUTION, PIGGYBACK (ML) INTRAVENOUS ONCE
Status: COMPLETED | OUTPATIENT
Start: 2019-11-20 | End: 2019-11-20

## 2019-11-20 RX ORDER — GLYCOPYRROLATE 0.2 MG/ML
INJECTION INTRAMUSCULAR; INTRAVENOUS AS NEEDED
Status: DISCONTINUED | OUTPATIENT
Start: 2019-11-20 | End: 2019-11-20 | Stop reason: SURG

## 2019-11-20 RX ORDER — DEXAMETHASONE SODIUM PHOSPHATE 10 MG/ML
INJECTION, SOLUTION INTRAMUSCULAR; INTRAVENOUS AS NEEDED
Status: DISCONTINUED | OUTPATIENT
Start: 2019-11-20 | End: 2019-11-20 | Stop reason: SURG

## 2019-11-20 RX ORDER — FAMOTIDINE 20 MG/1
20 TABLET, FILM COATED ORAL ONCE
Status: COMPLETED | OUTPATIENT
Start: 2019-11-20 | End: 2019-11-20

## 2019-11-20 RX ORDER — ATRACURIUM BESYLATE 10 MG/ML
INJECTION, SOLUTION INTRAVENOUS AS NEEDED
Status: DISCONTINUED | OUTPATIENT
Start: 2019-11-20 | End: 2019-11-20 | Stop reason: SURG

## 2019-11-20 RX ORDER — SODIUM CHLORIDE 0.9 % (FLUSH) 0.9 %
3-10 SYRINGE (ML) INJECTION AS NEEDED
Status: DISCONTINUED | OUTPATIENT
Start: 2019-11-20 | End: 2019-11-20 | Stop reason: HOSPADM

## 2019-11-20 RX ORDER — ONDANSETRON 2 MG/ML
4 INJECTION INTRAMUSCULAR; INTRAVENOUS EVERY 6 HOURS PRN
Status: DISCONTINUED | OUTPATIENT
Start: 2019-11-20 | End: 2019-11-21 | Stop reason: HOSPADM

## 2019-11-20 RX ORDER — PROMETHAZINE HYDROCHLORIDE 25 MG/ML
12.5 INJECTION, SOLUTION INTRAMUSCULAR; INTRAVENOUS EVERY 6 HOURS PRN
Status: DISCONTINUED | OUTPATIENT
Start: 2019-11-20 | End: 2019-11-21 | Stop reason: HOSPADM

## 2019-11-20 RX ORDER — BISOPROLOL FUMARATE 5 MG/1
5 TABLET, FILM COATED ORAL
Status: DISCONTINUED | OUTPATIENT
Start: 2019-11-20 | End: 2019-11-21 | Stop reason: HOSPADM

## 2019-11-20 RX ORDER — HEPARIN SODIUM 1000 [USP'U]/ML
INJECTION, SOLUTION INTRAVENOUS; SUBCUTANEOUS AS NEEDED
Status: DISCONTINUED | OUTPATIENT
Start: 2019-11-20 | End: 2019-11-20 | Stop reason: SURG

## 2019-11-20 RX ORDER — OXYCODONE AND ACETAMINOPHEN 10; 325 MG/1; MG/1
1 TABLET ORAL EVERY 4 HOURS PRN
Status: DISCONTINUED | OUTPATIENT
Start: 2019-11-20 | End: 2019-11-21 | Stop reason: HOSPADM

## 2019-11-20 RX ORDER — FENTANYL CITRATE 50 UG/ML
INJECTION, SOLUTION INTRAMUSCULAR; INTRAVENOUS AS NEEDED
Status: DISCONTINUED | OUTPATIENT
Start: 2019-11-20 | End: 2019-11-20 | Stop reason: SURG

## 2019-11-20 RX ORDER — LIDOCAINE HYDROCHLORIDE 10 MG/ML
0.5 INJECTION, SOLUTION EPIDURAL; INFILTRATION; INTRACAUDAL; PERINEURAL ONCE AS NEEDED
Status: COMPLETED | OUTPATIENT
Start: 2019-11-20 | End: 2019-11-20

## 2019-11-20 RX ORDER — DEXAMETHASONE SODIUM PHOSPHATE 4 MG/ML
INJECTION, SOLUTION INTRA-ARTICULAR; INTRALESIONAL; INTRAMUSCULAR; INTRAVENOUS; SOFT TISSUE AS NEEDED
Status: DISCONTINUED | OUTPATIENT
Start: 2019-11-20 | End: 2019-11-20 | Stop reason: SURG

## 2019-11-20 RX ORDER — MAGNESIUM HYDROXIDE 1200 MG/15ML
LIQUID ORAL AS NEEDED
Status: DISCONTINUED | OUTPATIENT
Start: 2019-11-20 | End: 2019-11-20 | Stop reason: HOSPADM

## 2019-11-20 RX ORDER — HYDROMORPHONE HYDROCHLORIDE 1 MG/ML
0.5 INJECTION, SOLUTION INTRAMUSCULAR; INTRAVENOUS; SUBCUTANEOUS
Status: DISCONTINUED | OUTPATIENT
Start: 2019-11-20 | End: 2019-11-20 | Stop reason: HOSPADM

## 2019-11-20 RX ORDER — ONDANSETRON 4 MG/1
4 TABLET, FILM COATED ORAL EVERY 6 HOURS PRN
Status: DISCONTINUED | OUTPATIENT
Start: 2019-11-20 | End: 2019-11-21 | Stop reason: HOSPADM

## 2019-11-20 RX ORDER — ONDANSETRON 2 MG/ML
4 INJECTION INTRAMUSCULAR; INTRAVENOUS ONCE AS NEEDED
Status: DISCONTINUED | OUTPATIENT
Start: 2019-11-20 | End: 2019-11-20 | Stop reason: HOSPADM

## 2019-11-20 RX ORDER — ONDANSETRON 2 MG/ML
INJECTION INTRAMUSCULAR; INTRAVENOUS AS NEEDED
Status: DISCONTINUED | OUTPATIENT
Start: 2019-11-20 | End: 2019-11-20 | Stop reason: SURG

## 2019-11-20 RX ORDER — HYDROCODONE BITARTRATE AND ACETAMINOPHEN 5; 325 MG/1; MG/1
1 TABLET ORAL ONCE AS NEEDED
Status: DISCONTINUED | OUTPATIENT
Start: 2019-11-20 | End: 2019-11-20 | Stop reason: HOSPADM

## 2019-11-20 RX ORDER — PROPOFOL 10 MG/ML
VIAL (ML) INTRAVENOUS AS NEEDED
Status: DISCONTINUED | OUTPATIENT
Start: 2019-11-20 | End: 2019-11-20 | Stop reason: SURG

## 2019-11-20 RX ORDER — LIDOCAINE HYDROCHLORIDE 10 MG/ML
INJECTION, SOLUTION EPIDURAL; INFILTRATION; INTRACAUDAL; PERINEURAL AS NEEDED
Status: DISCONTINUED | OUTPATIENT
Start: 2019-11-20 | End: 2019-11-20 | Stop reason: SURG

## 2019-11-20 RX ORDER — ACETAMINOPHEN 500 MG
1000 TABLET ORAL EVERY 6 HOURS PRN
Status: DISCONTINUED | OUTPATIENT
Start: 2019-11-20 | End: 2019-11-21 | Stop reason: HOSPADM

## 2019-11-20 RX ORDER — HEPARIN SODIUM 5000 [USP'U]/ML
5000 INJECTION, SOLUTION INTRAVENOUS; SUBCUTANEOUS EVERY 8 HOURS SCHEDULED
Status: DISCONTINUED | OUTPATIENT
Start: 2019-11-20 | End: 2019-11-21 | Stop reason: HOSPADM

## 2019-11-20 RX ORDER — CEFAZOLIN SODIUM 1 G/50ML
1 INJECTION, SOLUTION INTRAVENOUS EVERY 8 HOURS
Status: COMPLETED | OUTPATIENT
Start: 2019-11-20 | End: 2019-11-21

## 2019-11-20 RX ORDER — BUPIVACAINE HYDROCHLORIDE 2.5 MG/ML
INJECTION, SOLUTION EPIDURAL; INFILTRATION; INTRACAUDAL
Status: COMPLETED | OUTPATIENT
Start: 2019-11-20 | End: 2019-11-20

## 2019-11-20 RX ORDER — NEOSTIGMINE METHYLSULFATE 1 MG/ML
INJECTION, SOLUTION INTRAVENOUS AS NEEDED
Status: DISCONTINUED | OUTPATIENT
Start: 2019-11-20 | End: 2019-11-20 | Stop reason: SURG

## 2019-11-20 RX ADMIN — OXYCODONE HYDROCHLORIDE AND ACETAMINOPHEN 1 TABLET: 10; 325 TABLET ORAL at 15:58

## 2019-11-20 RX ADMIN — DEXAMETHASONE SODIUM PHOSPHATE 6 MG: 4 INJECTION, SOLUTION INTRAMUSCULAR; INTRAVENOUS at 08:00

## 2019-11-20 RX ADMIN — HEPARIN SODIUM 5000 UNITS: 5000 INJECTION, SOLUTION INTRAVENOUS; SUBCUTANEOUS at 15:32

## 2019-11-20 RX ADMIN — ONDANSETRON 8 MG: 2 INJECTION INTRAMUSCULAR; INTRAVENOUS at 09:11

## 2019-11-20 RX ADMIN — NEOSTIGMINE METHYLSULFATE 2 MG: 1 INJECTION, SOLUTION INTRAVENOUS at 09:28

## 2019-11-20 RX ADMIN — SODIUM CHLORIDE, POTASSIUM CHLORIDE, SODIUM LACTATE AND CALCIUM CHLORIDE 50 ML/HR: 600; 310; 30; 20 INJECTION, SOLUTION INTRAVENOUS at 15:35

## 2019-11-20 RX ADMIN — SODIUM CHLORIDE, POTASSIUM CHLORIDE, SODIUM LACTATE AND CALCIUM CHLORIDE: 600; 310; 30; 20 INJECTION, SOLUTION INTRAVENOUS at 08:52

## 2019-11-20 RX ADMIN — ATRACURIUM BESYLATE 50 MG: 10 INJECTION, SOLUTION INTRAVENOUS at 07:57

## 2019-11-20 RX ADMIN — DEXAMETHASONE SODIUM PHOSPHATE 4 MG: 10 INJECTION INTRAMUSCULAR; INTRAVENOUS at 08:00

## 2019-11-20 RX ADMIN — SODIUM CHLORIDE, POTASSIUM CHLORIDE, SODIUM LACTATE AND CALCIUM CHLORIDE 9 ML/HR: 600; 310; 30; 20 INJECTION, SOLUTION INTRAVENOUS at 07:20

## 2019-11-20 RX ADMIN — HEPARIN SODIUM 5000 UNITS: 1000 INJECTION, SOLUTION INTRAVENOUS; SUBCUTANEOUS at 08:03

## 2019-11-20 RX ADMIN — GLYCOPYRROLATE 0.2 MG: 0.2 INJECTION, SOLUTION INTRAMUSCULAR; INTRAVENOUS at 09:28

## 2019-11-20 RX ADMIN — CEFAZOLIN SODIUM 1 G: 1 INJECTION, SOLUTION INTRAVENOUS at 23:27

## 2019-11-20 RX ADMIN — LIDOCAINE HYDROCHLORIDE 60 MG: 10 INJECTION, SOLUTION EPIDURAL; INFILTRATION; INTRACAUDAL; PERINEURAL at 07:57

## 2019-11-20 RX ADMIN — FENTANYL CITRATE 100 MCG: 50 INJECTION, SOLUTION INTRAMUSCULAR; INTRAVENOUS at 07:57

## 2019-11-20 RX ADMIN — PROPOFOL 50 MG: 10 INJECTION, EMULSION INTRAVENOUS at 09:16

## 2019-11-20 RX ADMIN — LIDOCAINE HYDROCHLORIDE 0.2 ML: 10 INJECTION, SOLUTION EPIDURAL; INFILTRATION; INTRACAUDAL; PERINEURAL at 07:20

## 2019-11-20 RX ADMIN — HEPARIN SODIUM 5000 UNITS: 5000 INJECTION, SOLUTION INTRAVENOUS; SUBCUTANEOUS at 23:27

## 2019-11-20 RX ADMIN — Medication 3 G: at 08:00

## 2019-11-20 RX ADMIN — BUPIVACAINE HYDROCHLORIDE 60 ML: 2.5 INJECTION, SOLUTION EPIDURAL; INFILTRATION; INTRACAUDAL; PERINEURAL at 08:00

## 2019-11-20 RX ADMIN — BUPRENORPHINE HYDROCHLORIDE 0.3 MG: 0.32 INJECTION INTRAMUSCULAR; INTRAVENOUS at 08:00

## 2019-11-20 RX ADMIN — FAMOTIDINE 20 MG: 20 TABLET, FILM COATED ORAL at 07:28

## 2019-11-20 RX ADMIN — HYDROMORPHONE HYDROCHLORIDE 0.5 MG: 1 INJECTION, SOLUTION INTRAMUSCULAR; INTRAVENOUS; SUBCUTANEOUS at 10:03

## 2019-11-20 RX ADMIN — PROPOFOL 200 MG: 10 INJECTION, EMULSION INTRAVENOUS at 07:57

## 2019-11-20 RX ADMIN — ACETAMINOPHEN 1000 MG: 500 TABLET ORAL at 10:35

## 2019-11-20 RX ADMIN — CEFAZOLIN SODIUM 1 G: 1 INJECTION, SOLUTION INTRAVENOUS at 15:32

## 2019-11-20 NOTE — PLAN OF CARE
Problem: Patient Care Overview  Goal: Plan of Care Review  Outcome: Ongoing (interventions implemented as appropriate)   11/20/19 6210   Coping/Psychosocial   Plan of Care Reviewed With patient   Plan of Care Review   Progress improving   OTHER   Outcome Summary prn pain meds given, no c/o of n/v, will cont to monitor.

## 2019-11-20 NOTE — ANESTHESIA POSTPROCEDURE EVALUATION
Patient: Satish Will    Procedure Summary     Date:  11/20/19 Room / Location:   [facility] 09 /  [facility]    Anesthesia Start:  0752 Anesthesia Stop:  0952    Procedure:  UMBILICAL HERNIA REPAIR WITH MESH (N/A Abdomen) Diagnosis:      Surgeon:  Uday Merino MD Provider:  Wisam Ramírez MD    Anesthesia Type:  general ASA Status:  3          Anesthesia Type: general  Last vitals  BP   135/93 (11/20/19 0731)   Temp   98.6 °F (37 °C) (11/20/19 0952)   Pulse   78 (11/20/19 0952)   Resp   16 (11/20/19 0952)     SpO2   95 % (11/20/19 0952)     Post Anesthesia Care and Evaluation    Patient location during evaluation: PACU  Patient participation: complete - patient participated  Level of consciousness: awake and alert  Pain management: adequate  Airway patency: patent  Anesthetic complications: No anesthetic complications  PONV Status: none  Cardiovascular status: acceptable  Respiratory status: acceptable  Hydration status: acceptable

## 2019-11-20 NOTE — ANESTHESIA PREPROCEDURE EVALUATION
Anesthesia Evaluation                  Airway   Mallampati: I  TM distance: >3 FB  Neck ROM: full  No difficulty expected  Dental      Pulmonary    (+) pulmonary embolism, sleep apnea,   Cardiovascular     ECG reviewed    (+) hypertension, DVT,     ROS comment: Echo acceptable    Neuro/Psych  GI/Hepatic/Renal/Endo    (+) morbid obesity,      Musculoskeletal     Abdominal    Substance History      OB/GYN          Other                        Anesthesia Plan    ASA 3     general   (Tap)  intravenous induction     Anesthetic plan, all risks, benefits, and alternatives have been provided, discussed and informed consent has been obtained with: patient.    Plan discussed with CRNA.

## 2019-11-20 NOTE — ANESTHESIA PROCEDURE NOTES
Peripheral Block      Patient reassessed immediately prior to procedure    Patient location during procedure: OR  Reason for block: at surgeon's request and post-op pain management  Preanesthetic Checklist  Completed: patient identified, site marked, surgical consent, pre-op evaluation, timeout performed, IV checked, risks and benefits discussed and monitors and equipment checked  Prep:  Pt Position: supine  Sterile barriers:cap, gloves, sterile barriers and mask  Prep: ChloraPrep  Patient monitoring: blood pressure monitoring, continuous pulse oximetry and EKG  Procedure  Sedation:yes  Performed under: general  Guidance:ultrasound guided  Images:still images obtained, printed/placed on chart    Laterality:Bilateral  Block Type:TAP  Injection Technique:single-shot  Needle Type:short-bevel and echogenic  Needle Gauge:20 G  Resistance on Injection: none    Medications Used: bupivacaine PF (MARCAINE) 0.25 % injection, 60 mL  Med admintered at 11/20/2019 8:00 AM      Medications  Comment:Block Injection:  LA dose divided between Right and Left block        Post Assessment  Injection Assessment: negative aspiration for heme, incremental injection and no paresthesia on injection  Patient Tolerance:comfortable throughout block  Complications:no  Additional Notes      Under Ultrasound guidance, a BBraun 4inch 360 degree needle was advanced with Normal Saline hydro dissection of tissue.  The Internal Oblique and Transversus Abdominus muscles where visualized.  At or before the aponeurosis of Internal Oblique, local anesthetic spread was visualized in the Transversus Abdominus Plane. Injection was made incrementally with aspiration every 5 mls.  There was no  intravascular injection,  injection pressure was normal, there was no neural injection, and the procedure was completed without difficulty.  Thank You.

## 2019-11-20 NOTE — OP NOTE
UMBILICAL HERNIA REPAIR  Procedure Note    Satish Will  Date of Surgery:  11/20/2019    Pre-op Diagnosis:   Umbilical hernia    Post-op Diagnosis:     Same    Operative Procedure:   Repair of umbilical hernia with 10 x 15 cm ventral light patch    Indications:  Mr. Rouse is a 39-year-old male with a large symptomatic umbilical hernia.  He presents today for elective repair.    Operative note:  On 11/20/2019, the patient was taken to the operating room and placed supine on the operating table.  Following adequate induction of general endotracheal anesthesia, the abdomen was prepped and draped in the standard fashion.  A transverse infraumbilical incision was made.  The subcutaneous tissue was divided down to the rectus fascia.  The patient had a large defect.  The umbilical ring was encircled at the level of the rectus fascia using a hemostat.  The umbilical skin fold was sharply dissected off the defect.  Retraction was placed into the wound and the anterior fascia dissected around the defect.  The edges of the defect were then sharply debrided and the herniating preperitoneal fat was reduced beneath the abdominal wall.  Likewise, the undersurface of the abdominal wall was clean for several centimeters to allow placement of the mesh.  The surgeon changed gloves and a 10 x 15 cm ventral light patch brought into the field.  This was trimmed appropriately and then sewn to the undersurface of the abdominal wall using full-thickness sutures of #1 Vicryl.  Care was taken as the sutures were tied to make sure that no fat crept up between the mesh and the anterior abdominal wall.  The rectus fascia was then closed over the mesh with more interrupted #1 Vicryl's.  The wound was irrigated and hemostasis was excellent.  The umbilical skin fold was inverted to the midline the repair with a final 0 Vicryl.  The subcutaneous tissue was then closed in layers to obliterate the dead space with interrupted 0 Vicryl's and the  skin closed with a running 4-0 Vicryl subcuticular suture.  A sterile dressing was applied in the operating room.  The patient tolerated the procedure well and there were no complications.  Estimated blood loss is 50 mL's.  Both preoperative and postoperative sponge and needle counts were correct.  The patient was taken to the recovery room in satisfactory condition.    Uday Merino MD     Date: 11/20/2019  Time: 9:49 AM

## 2019-11-20 NOTE — BRIEF OP NOTE
UMBILICAL HERNIA REPAIR  Progress Note    Satish Will  11/20/2019    Pre-op Diagnosis:   Umbilical hernia       Post-Op Diagnosis Codes:   same    Procedure/CPT® Codes:      Procedure(s):  UMBILICAL HERNIA REPAIR WITH MESH    Surgeon(s):  Uday Merino MD    Anesthesia: General with Block    Staff:   Circulator: Payton Foster RN; Chaya Razo RN  Scrub Person: Gila Mayorga  Assistant: Maria G Dozier PA-C    Estimated Blood Loss: 50ml    Urine Voided: * No values recorded between 11/20/2019  7:52 AM and 11/20/2019  9:29 AM *    Specimens:                None          Drains:      Findings: large fascial defect centered on umbilical ring    Complications: none      Uday Merino MD     Date: 11/20/2019  Time: 9:29 AM

## 2019-11-20 NOTE — ANESTHESIA PROCEDURE NOTES
Airway  Urgency: elective    Airway not difficult    General Information and Staff    Patient location during procedure: OR    Indications and Patient Condition  Indications for airway management: airway protection    Preoxygenated: yes  MILS not maintained throughout  Mask difficulty assessment: 1 - vent by mask    Final Airway Details  Final airway type: endotracheal airway      Successful airway: ETT  Cuffed: yes   Successful intubation technique: direct laryngoscopy  Facilitating devices/methods: Bougie  Endotracheal tube insertion site: oral  Blade: Kristian  Blade size: 3  ETT size (mm): 7.5  Cormack-Lehane Classification: grade I - full view of glottis  Placement verified by: chest auscultation and capnometry   Measured from: lips  ETT/EBT  to lips (cm): 20  Number of attempts at approach: 1

## 2019-11-20 NOTE — PROGRESS NOTES
"GEN SURG PROGRESS NOTE     LOS: 0 days   Patient Care Team:  Mckinley López PA as PCP - General (Family Medicine)  Hilary Webster MD as Consulting Physician (Cardiology)    Chief Complaint:      Subjective     Interval History:     Patient Complaints: Sore  Patient Denies:    History taken from: patient    Review of Systems:        Objective     Vital Signs  /96 (BP Location: Right arm, Patient Position: Standing)   Pulse 90   Temp 97.9 °F (36.6 °C) (Oral)   Resp 16   Ht 180.3 cm (71\")   Wt (!) 168 kg (370 lb)   SpO2 90%   BMI 51.60 kg/m²     Physical Exam:   Binder in place     Results Review:     I reviewed the patient's new clinical results.  Labs  Lab Results (last 72 hours)     ** No results found for the last 72 hours. **          Rads  Imaging Results (Last 72 Hours)     ** No results found for the last 72 hours. **          Assessment/Plan    Stable postop      Ventral hernia        Uday Merino MD  11/20/19  1:36 PM        "

## 2019-11-20 NOTE — H&P
"Pre-Op H&P  Satish Will  [id number]  1980    Chief complaint: Umbilical hernia    HPI:    Patient is a 39 y.o.male who presents with an umbilical hernia of recent onset. He complains of a bulge. Surgical intervention is recommended and he is agreeable.  He is here today for an umbilical hernia repair with mesh.  Patient states that he stopped taking his Eliquis on 11/16/19.    Review of Systems:  General ROS: negative for chills, fever or skin lesions;  No changes since last office visit  Cardiovascular ROS: no chest pain or dyspnea on exertion; +HTN, history of bilateral DVT  Respiratory ROS: no cough, shortness of breath, or wheezing; history of bilateral PE, +RAFAEL (uses CPAP), occasionally smokes cigars    Allergies: No Known Allergies    Home Meds:    No current facility-administered medications on file prior to encounter.      Current Outpatient Medications on File Prior to Encounter   Medication Sig Dispense Refill   • bisoprolol (ZEBeta) 5 MG tablet Take 1 tablet by mouth Daily. 30 tablet 11   • apixaban (ELIQUIS) 5 MG tablet tablet Take 1 tablet by mouth Every 12 (Twelve) Hours. 180 tablet 3       PMH:   Past Medical History:   Diagnosis Date   • Deep vein thrombosis (CMS/HCC) 10/24/2018   • Hypertension    • Obesity    • RAFAEL on CPAP    • Psoriasis    • Pulmonary embolism (CMS/HCC) 10/24/2018     PSH:    Past Surgical History:   Procedure Laterality Date   • WISDOM TOOTH EXTRACTION         Social History:   Tobacco:   Social History     Tobacco Use   Smoking Status Current Some Day Smoker   • Types: Cigars   Smokeless Tobacco Never Used   Tobacco Comment    rare cigar use 1/month       Alcohol:     Social History     Substance and Sexual Activity   Alcohol Use Yes    Comment: 5 drinks daily, liquor        Vitals:           /93 (BP Location: Right arm, Patient Position: Lying)   Pulse 83   Temp 97.5 °F (36.4 °C) (Temporal)   Resp 18   Ht 180.3 cm (71\")   Wt (!) 168 kg (370 lb)   SpO2 96% "   BMI 51.60 kg/m²     Physical Exam:  General Appearance:    Alert, cooperative, no distress, appears stated age   Head:    Normocephalic, without obvious abnormality, atraumatic   Lungs:     Clear to auscultation bilaterally, respirations unlabored    Heart:   Regular rate and rhythm, S1 and S2 normal, no murmur, rub    or gallop    Abdomen:    Soft, non-tender, +bowel sounds   Breast Exam:    deferred   Genitalia:    deferred   Extremities:   Extremities normal, atraumatic, no cyanosis or edema   Skin:   Skin color, texture, turgor normal, no rashes or lesions   Neurologic:   Grossly intact   Results Review  LABS:  Lab Results   Component Value Date    WBC 7.23 11/15/2019    HGB 15.8 11/15/2019    HCT 50.2 11/15/2019    MCV 86.4 11/15/2019     11/15/2019    NEUTROABS 4.28 12/04/2018    GLUCOSE 101 (H) 11/15/2019    BUN 14 11/15/2019    CREATININE 1.13 11/15/2019    EGFRIFAFRI 88 11/15/2019     11/15/2019    K 4.4 11/15/2019     11/15/2019    CO2 28.0 11/15/2019    MG 2.0 10/27/2018    PHOS 4.1 10/27/2018    CALCIUM 9.3 11/15/2019    ALBUMIN 4.48 12/04/2018    AST 19 12/04/2018    ALT 20 12/04/2018    BILITOT 1.2 12/04/2018       RADIOLOGY:  Imaging Results (Last 72 Hours)     ** No results found for the last 72 hours. **          I reviewed the patient's new clinical results.     11/15/19: ECG reviewed- NSR    10/26/18 TTE:  Interpretation Summary     · Left ventricular systolic function is normal. Estimated EF = 55%.  · Right ventricular cavity is dilated.  · Moderately reduced right ventricular systolic function noted.  · Left ventricular wall thickness is consistent with mild concentric hypertrophy.  · Mild tricuspid valve regurgitation is present.  · RVSP(TR) 76 mmHg       Cancer Staging (if applicable)  Cancer Patient: __ yes _X_no __unknown; If yes, clinical stage T:__ N:__M:__, stage group or __N/A    Impression: Umbilical hernia without obstruction or gangrene    Plan: Umbilical hernia  repair with mesh      Anya Still, APRN   11/20/2019   7:34 AM

## 2019-11-21 VITALS
HEART RATE: 74 BPM | OXYGEN SATURATION: 92 % | DIASTOLIC BLOOD PRESSURE: 99 MMHG | RESPIRATION RATE: 20 BRPM | HEIGHT: 71 IN | TEMPERATURE: 97.9 F | WEIGHT: 315 LBS | BODY MASS INDEX: 44.1 KG/M2 | SYSTOLIC BLOOD PRESSURE: 163 MMHG

## 2019-11-21 PROCEDURE — 25010000002 HEPARIN (PORCINE) PER 1000 UNITS: Performed by: SURGERY

## 2019-11-21 PROCEDURE — G0378 HOSPITAL OBSERVATION PER HR: HCPCS

## 2019-11-21 RX ORDER — OXYCODONE AND ACETAMINOPHEN 10; 325 MG/1; MG/1
1 TABLET ORAL EVERY 6 HOURS PRN
Qty: 20 TABLET | Refills: 0 | Status: SHIPPED | OUTPATIENT
Start: 2019-11-21 | End: 2019-11-30

## 2019-11-21 RX ADMIN — OXYCODONE HYDROCHLORIDE AND ACETAMINOPHEN 1 TABLET: 10; 325 TABLET ORAL at 08:56

## 2019-11-21 RX ADMIN — OXYCODONE HYDROCHLORIDE AND ACETAMINOPHEN 1 TABLET: 10; 325 TABLET ORAL at 01:46

## 2019-11-21 RX ADMIN — BISOPROLOL FUMARATE 5 MG: 5 TABLET ORAL at 08:53

## 2019-11-21 RX ADMIN — HEPARIN SODIUM 5000 UNITS: 5000 INJECTION, SOLUTION INTRAVENOUS; SUBCUTANEOUS at 06:22

## 2019-11-21 NOTE — PLAN OF CARE
Problem: Patient Care Overview  Goal: Plan of Care Review  Outcome: Ongoing (interventions implemented as appropriate)   11/21/19 0505   Coping/Psychosocial   Plan of Care Reviewed With patient   Plan of Care Review   Progress improving   OTHER   Outcome Summary Patient has rested well in bed during the shift. Patient has been up ad alberto and has been using his IS and wearing his SCDS. Patient had 1 complaint of pain controlled by PO pain meds. Patient is hoping to be d/c'd home today.        Problem: Pain, Acute (Adult)  Goal: Identify Related Risk Factors and Signs and Symptoms  Outcome: Ongoing (interventions implemented as appropriate)   11/21/19 0505   Pain, Acute (Adult)   Related Risk Factors (Acute Pain) surgery;procedure/treatment   Signs and Symptoms (Acute Pain) verbalization of pain descriptors     Goal: Acceptable Pain Control/Comfort Level  Outcome: Ongoing (interventions implemented as appropriate)   11/21/19 0505   Pain, Acute (Adult)   Acceptable Pain Control/Comfort Level making progress toward outcome       Problem: Health Knowledge, Opportunity to Enhance (Adult,Obstetrics,Pediatric)  Goal: Identify Related Risk Factors and Signs and Symptoms  Outcome: Ongoing (interventions implemented as appropriate)

## 2019-11-21 NOTE — PROGRESS NOTES
"Date of Discharge:  11/21/2019    Discharge Diagnosis:   Umbilical hernia    Problem List:    Ventral hernia      Presenting Problem/History of Present Illness  Umbilical hernia without obstruction or gangrene [K42.9]  Ventral hernia [K43.9]        Hospital Course  Patient is a 39 y.o. male presented with an enlarging symptomatic umbilical hernia.  On 11/20/2019 he underwent a mesh repair of this.  His anticoagulation status had been continued with a Lovenox bridge which had been stopped approximately 24 hours prior to surgery.  His surgery went well and his postoperative course has been unremarkable.  He has been able to ambulate and void.  He has tolerated a diet.  His pain has been well controlled.  On the date of discharge, he is afebrile and in satisfactory condition.  His abdominal dressing is dry and he is in a binder, which she is to wear except for bathing until he sees me in my office.  He is discharged home in satisfactory condition with wound care and activity limitations thoroughly discussed with him.  He will resume his medications as at home, including his Eliquis, which he may resume tonight.  In addition, a prescription for Percocet 10/325 #20 was given.  He will follow-up in my office in 2 weeks..      Procedures Performed  Procedure(s):  UMBILICAL HERNIA REPAIR WITH MESH       Consults:   Consults     No orders found for last 30 day(s).          Pertinent Test Results:       Vital Signs  /84 (BP Location: Right arm, Patient Position: Lying)   Pulse 98   Temp 98.3 °F (36.8 °C) (Oral)   Resp 18   Ht 180.3 cm (71\")   Wt (!) 168 kg (370 lb)   SpO2 92%   BMI 51.60 kg/m²     Discharge Disposition  Where: home      Discharge Medications     Discharge Medications      New Medications      Instructions Start Date   oxyCODONE-acetaminophen  MG per tablet  Commonly known as:  PERCOCET   1 tablet, Oral, Every 6 Hours PRN         Continue These Medications      Instructions Start Date "   apixaban 5 MG tablet tablet  Commonly known as:  ELIQUIS   5 mg, Oral, Every 12 Hours Scheduled      bisoprolol 5 MG tablet  Commonly known as:  ZEBeta   5 mg, Oral, Every 24 Hours Scheduled      STELARA SC   1 dose, Subcutaneous, Every 3 Months             Discharge Diet   Regular    Activity at Discharge  No heavy lifting or strenuous physical exercise    Follow-up Appointments  Dr. merino in 2 weeks      Test Results Pending at Discharge  None      Uday Merino MD  11/21/19  7:17 AM

## 2019-11-21 NOTE — PLAN OF CARE
Problem: Patient Care Overview  Goal: Plan of Care Review  Outcome: Ongoing (interventions implemented as appropriate)  Patient ambulating with no distress, states feeling better, tolerated breaktfast well.

## 2019-11-22 ENCOUNTER — TRANSITIONAL CARE MANAGEMENT TELEPHONE ENCOUNTER (OUTPATIENT)
Dept: FAMILY MEDICINE CLINIC | Facility: CLINIC | Age: 39
End: 2019-11-22

## 2019-11-25 NOTE — OUTREACH NOTE
"[facility] call completed. Please see flow sheet for additional details.  Pt reports doing \"pretty good\" last day or two, though he does c/o discomfort from abdominal brace that he says is riding up to his rib cage.  States he was advised by Dr Merino to wear it when up/may remove when lying down. He was advised to call Dr Merino, but pt says he would appreciate PCP opinion, if he thinks there are other options.  Had small stool yesterday & today. Pt bought some mag citrate that he hasn't yet taken - he was advised stool softener or miralax instead and he verb understanding. Pain well controlled w/ medication - using sparingly.  Denies F/C/S, N/V, wound redness, drainage, odor.  He denies immediate needs and confirms ZAID 12/2/19, Dr Merino 12/9.   "

## 2019-11-26 ENCOUNTER — TELEPHONE (OUTPATIENT)
Dept: FAMILY MEDICINE CLINIC | Facility: CLINIC | Age: 39
End: 2019-11-26

## 2019-11-27 ENCOUNTER — TELEPHONE (OUTPATIENT)
Dept: FAMILY MEDICINE CLINIC | Facility: CLINIC | Age: 39
End: 2019-11-27

## 2019-11-27 NOTE — TELEPHONE ENCOUNTER
"Pt called the [facility] and left a voice message at 1:00pm today :    \"Yes my name is Satish Will, birthday is [birth year]. I was needing a nurse to call me back. I have a few questions about some changes with a recent procedure that I went through.\"  "

## 2019-11-27 NOTE — TELEPHONE ENCOUNTER
Message left for patient because I have questions regarding his paperwork. He has an appointment Monday Dec 2.

## 2019-11-27 NOTE — TELEPHONE ENCOUNTER
I tried to contact patient but no answer. Message left informing him that Darrell doesn't have any other recommendations other than continuing to wear brace (see other message). He has an appointment Dec 2.

## 2019-12-02 ENCOUNTER — OFFICE VISIT (OUTPATIENT)
Dept: FAMILY MEDICINE CLINIC | Facility: CLINIC | Age: 39
End: 2019-12-02

## 2019-12-02 VITALS
DIASTOLIC BLOOD PRESSURE: 84 MMHG | HEIGHT: 71 IN | HEART RATE: 93 BPM | TEMPERATURE: 98.2 F | SYSTOLIC BLOOD PRESSURE: 132 MMHG | BODY MASS INDEX: 44.1 KG/M2 | WEIGHT: 315 LBS | OXYGEN SATURATION: 99 %

## 2019-12-02 DIAGNOSIS — Z09 STATUS POST UMBILICAL HERNIA REPAIR, FOLLOW-UP EXAM: Primary | ICD-10-CM

## 2019-12-02 PROCEDURE — 99212 OFFICE O/P EST SF 10 MIN: CPT | Performed by: PHYSICIAN ASSISTANT

## 2019-12-02 RX ORDER — USTEKINUMAB 90 MG/ML
INJECTION, SOLUTION SUBCUTANEOUS
COMMUNITY
Start: 2019-10-17 | End: 2020-05-14

## 2019-12-02 NOTE — PROGRESS NOTES
Subjective   Satish Will is a 39 y.o. male  Hernia (Follow up on hernia repair surgery from 11/20/2019 at [facility])      History of Present Illness  Patient is a pleasant 39-year-old black male who comes in for follow-up of prior surgery on 1120 1219 for umbilical hernia patient is doing better no problems or complaints meds working well no shortness of breath no chest pain  The following portions of the patient's history were reviewed and updated as appropriate: allergies, current medications, past social history and problem list    Review of Systems   Constitutional: Negative for appetite change, diaphoresis, fatigue and unexpected weight change.   Eyes: Negative for visual disturbance.   Respiratory: Negative for cough, chest tightness and shortness of breath.    Cardiovascular: Negative for chest pain, palpitations and leg swelling.   Gastrointestinal: Negative for diarrhea, nausea and vomiting.   Endocrine: Negative for polydipsia, polyphagia and polyuria.   Skin: Negative for color change and rash.   Neurological: Negative for dizziness, syncope, weakness, light-headedness, numbness and headaches.       Objective     Vitals:    12/02/19 1405   BP: 132/84   Pulse: 93   Temp: 98.2 °F (36.8 °C)   SpO2: 99%       Physical Exam   Constitutional: He appears well-developed and well-nourished.   Neck: Neck supple. No JVD present. No thyromegaly present.   Cardiovascular: Normal rate, regular rhythm, normal heart sounds, intact distal pulses and normal pulses.   No murmur heard.  Pulmonary/Chest: Effort normal and breath sounds normal. No respiratory distress.   Abdominal: Soft. Bowel sounds are normal. There is no hepatosplenomegaly. There is no tenderness.   Musculoskeletal: He exhibits no edema.   Lymphadenopathy:     He has no cervical adenopathy.   Neurological: No sensory deficit.   Skin: Skin is warm and dry. He is not diaphoretic.   Nursing note and vitals reviewed.      Assessment/Plan     Diagnoses and  all orders for this visit:    Status post umbilical hernia repair, follow-up exam    Other orders  -     STELARA 90 MG/ML solution prefilled syringe Injection    Follow-up as needed status post surgical healing well

## 2020-01-09 ENCOUNTER — TELEPHONE (OUTPATIENT)
Dept: FAMILY MEDICINE CLINIC | Facility: CLINIC | Age: 40
End: 2020-01-09

## 2020-01-09 NOTE — TELEPHONE ENCOUNTER
PT ASKED IF NURSE CAN GIVE HIM A CALL BACK HAS A COUPLE QUESTION ABOUT LAST VISIT AND SOME PAPER WORK HE NEEDS TO TALK TO HER ABOUT    PLEASE ADVISE AND CALL BACK

## 2020-01-10 NOTE — TELEPHONE ENCOUNTER
Pt called back requesting a call back to talk about paper work from his last visit.     Please call Pt [phone]

## 2020-01-10 NOTE — TELEPHONE ENCOUNTER
Patient had FMLA paperwork completed by Dr. Merino's office but needed a date to be changed. He called their office yesterday and today and was told that the lady that handles the paperwork is out sick and may be back Monday. I told him that unfortunately we can't help him and that I don't even have a copy of the FMLA forms. I advised to him to let HR know his situation.

## 2020-01-23 ENCOUNTER — OFFICE VISIT (OUTPATIENT)
Dept: FAMILY MEDICINE CLINIC | Facility: CLINIC | Age: 40
End: 2020-01-23

## 2020-01-23 VITALS
OXYGEN SATURATION: 98 % | RESPIRATION RATE: 16 BRPM | SYSTOLIC BLOOD PRESSURE: 142 MMHG | HEART RATE: 77 BPM | BODY MASS INDEX: 44.1 KG/M2 | DIASTOLIC BLOOD PRESSURE: 86 MMHG | WEIGHT: 315 LBS | HEIGHT: 71 IN

## 2020-01-23 DIAGNOSIS — R07.9 CHEST PAIN, UNSPECIFIED TYPE: ICD-10-CM

## 2020-01-23 DIAGNOSIS — N52.9 ERECTILE DYSFUNCTION, UNSPECIFIED ERECTILE DYSFUNCTION TYPE: ICD-10-CM

## 2020-01-23 DIAGNOSIS — M54.50 LOW BACK PAIN WITHOUT SCIATICA, UNSPECIFIED BACK PAIN LATERALITY, UNSPECIFIED CHRONICITY: Primary | ICD-10-CM

## 2020-01-23 PROCEDURE — 99214 OFFICE O/P EST MOD 30 MIN: CPT | Performed by: PHYSICIAN ASSISTANT

## 2020-01-23 RX ORDER — TIZANIDINE HYDROCHLORIDE 2 MG/1
2 CAPSULE, GELATIN COATED ORAL 3 TIMES DAILY
Qty: 30 CAPSULE | Refills: 1 | Status: SHIPPED | OUTPATIENT
Start: 2020-01-23 | End: 2021-09-16

## 2020-01-23 RX ORDER — BISOPROLOL FUMARATE 5 MG/1
5 TABLET, FILM COATED ORAL
Qty: 30 TABLET | Refills: 11 | Status: SHIPPED | OUTPATIENT
Start: 2020-01-23 | End: 2021-02-11 | Stop reason: SDUPTHER

## 2020-01-23 NOTE — PROGRESS NOTES
Subjective   Satish Will is a 39 y.o. male  Back Pain (Low back with bilat sciatica)      History of Present Illness  Patient is a pleasant 39-year-old black male who comes in with 2 complaints his first complaint is low back pain rating on his right left leg burning sensation posterior legs patient has pain with steps on hard surface for long peers time describes pain sharp stabbing patient's pain is worse with sitting standing for long periods of time numbness tingling in his right and left legs.  No over-the-counter medications used    2.  Patient complains of erectile dysfunction issues for last 6 months patient has trouble getting erection keep an erection and maintaining erection during intercourse he like to try something to help he has gained some weight is not in shape so he would like to start exercising things have also  The following portions of the patient's history were reviewed and updated as appropriate: allergies, current medications, past social history and problem list    Review of Systems   Constitutional: Negative.    HENT: Negative.    Eyes: Negative.    Respiratory: Negative.    Cardiovascular: Negative.    Gastrointestinal: Negative.    Endocrine: Negative.    Genitourinary: Negative.         ED   Musculoskeletal: Positive for back pain.   Skin: Negative.    Allergic/Immunologic: Negative.    Neurological: Negative.    Hematological: Negative.    Psychiatric/Behavioral: Negative.    All other systems reviewed and are negative.      Objective     Vitals:    01/23/20 1551   BP: 142/86   Pulse: 77   Resp: 16   SpO2: 98%       Physical Exam   Constitutional: He appears well-developed and well-nourished.   Neck: Neck supple. No JVD present. No thyromegaly present.   Cardiovascular: Normal rate, regular rhythm, normal heart sounds, intact distal pulses and normal pulses.   No murmur heard.  Pulmonary/Chest: Effort normal and breath sounds normal. No respiratory distress.   Abdominal: Soft.  Bowel sounds are normal. There is no hepatosplenomegaly. There is no tenderness.   Musculoskeletal: He exhibits no edema.        Lumbar back: He exhibits decreased range of motion, tenderness, bony tenderness and swelling.   Lymphadenopathy:     He has no cervical adenopathy.   Neurological: No sensory deficit.   Skin: Skin is warm and dry. He is not diaphoretic.   Nursing note and vitals reviewed.      Assessment/Plan     Satish was seen today for back pain.    Diagnoses and all orders for this visit:    Low back pain without sciatica, unspecified back pain laterality, unspecified chronicity  -     TiZANidine (ZANAFLEX) 2 MG capsule; Take 1 capsule by mouth 3 (Three) Times a Day.    Erectile dysfunction, unspecified erectile dysfunction type    #1 [place] 5/325 1 p.o. every 6 8 hours dispense 16  Zanaflex 2 mg twice a day to 3 times a day dispense 30×1 refill    2.  Cialis 20 mg 1 p.o. as directed PRN erectile dysfunction

## 2020-01-27 ENCOUNTER — TELEPHONE (OUTPATIENT)
Dept: FAMILY MEDICINE CLINIC | Facility: CLINIC | Age: 40
End: 2020-01-27

## 2020-01-27 NOTE — TELEPHONE ENCOUNTER
[facility]. [phone]  RECEIVED A RX FOR CEALIS AND CLARIFICATION IS NEEDING.

## 2020-01-28 ENCOUNTER — TELEPHONE (OUTPATIENT)
Dept: FAMILY MEDICINE CLINIC | Facility: CLINIC | Age: 40
End: 2020-01-28

## 2020-01-28 NOTE — TELEPHONE ENCOUNTER
Patient states that a piece of information was left off of his Cialis prescription as was wondering when Darrell will call them back to give them this information.  He can be reached at [phone]    [employer], [place]

## 2020-01-29 ENCOUNTER — TELEPHONE (OUTPATIENT)
Dept: FAMILY MEDICINE CLINIC | Facility: CLINIC | Age: 40
End: 2020-01-29

## 2020-01-29 RX ORDER — TADALAFIL 20 MG/1
TABLET ORAL
Qty: 30 TABLET | Refills: 3 | Status: SHIPPED | OUTPATIENT
Start: 2020-01-29 | End: 2021-02-05 | Stop reason: SDUPTHER

## 2020-01-29 NOTE — TELEPHONE ENCOUNTER
[facility] and noted to cancel the other one.

## 2020-01-29 NOTE — TELEPHONE ENCOUNTER
"Queenie from [facility] on [address] [address] called and stated that clarification for the Cialis prescription is needed.  The prescription is listed \"as directed\",  Queenie needs to know the frequency for dosing.     PH: [phone]  FX: [phone]     Please advise.  "

## 2020-05-14 ENCOUNTER — APPOINTMENT (OUTPATIENT)
Dept: CT IMAGING | Facility: HOSPITAL | Age: 40
End: 2020-05-14

## 2020-05-14 ENCOUNTER — HOSPITAL ENCOUNTER (EMERGENCY)
Facility: HOSPITAL | Age: 40
Discharge: HOME | End: 2020-05-14
Attending: EMERGENCY MEDICINE | Admitting: EMERGENCY MEDICINE

## 2020-05-14 VITALS
DIASTOLIC BLOOD PRESSURE: 104 MMHG | RESPIRATION RATE: 18 BRPM | WEIGHT: 315 LBS | OXYGEN SATURATION: 92 % | TEMPERATURE: 98.2 F | SYSTOLIC BLOOD PRESSURE: 139 MMHG | BODY MASS INDEX: 44.1 KG/M2 | HEART RATE: 79 BPM | HEIGHT: 71 IN

## 2020-05-14 DIAGNOSIS — R51.9 NONINTRACTABLE HEADACHE, UNSPECIFIED CHRONICITY PATTERN, UNSPECIFIED HEADACHE TYPE: ICD-10-CM
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
## 2020-05-14 DIAGNOSIS — S16.1XXA ACUTE CERVICAL MYOFASCIAL STRAIN, INITIAL ENCOUNTER: Primary | ICD-10-CM

## 2020-05-14 PROCEDURE — 70450 CT HEAD/BRAIN W/O DYE: CPT

## 2020-05-14 PROCEDURE — 99283 EMERGENCY DEPT VISIT LOW MDM: CPT

## 2020-05-14 PROCEDURE — 72125 CT NECK SPINE W/O DYE: CPT
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
## 2020-05-14 NOTE — DISCHARGE INSTRUCTIONS
Home to rest.  You may continue to use your muscle relaxer as you do routinely.  Add tylenol 650mg every 4-6 hours as needed.  Warm compresses can also be helpful.  Follow-up with Darrell López to monitor your recovery.  Thank you

## 2020-05-20 ENCOUNTER — OFFICE VISIT (OUTPATIENT)
Dept: FAMILY MEDICINE CLINIC | Facility: CLINIC | Age: 40
End: 2020-05-20

## 2020-05-20 VITALS
TEMPERATURE: 97.3 F | WEIGHT: 315 LBS | HEART RATE: 87 BPM | BODY MASS INDEX: 44.1 KG/M2 | HEIGHT: 71 IN | RESPIRATION RATE: 16 BRPM | DIASTOLIC BLOOD PRESSURE: 90 MMHG | SYSTOLIC BLOOD PRESSURE: 128 MMHG | OXYGEN SATURATION: 100 %

## 2020-05-20 DIAGNOSIS — M54.50 LOW BACK PAIN WITHOUT SCIATICA, UNSPECIFIED BACK PAIN LATERALITY, UNSPECIFIED CHRONICITY: ICD-10-CM

## 2020-05-20 DIAGNOSIS — M54.2 NECK PAIN: Primary | ICD-10-CM

## 2020-05-20 PROCEDURE — 99213 OFFICE O/P EST LOW 20 MIN: CPT | Performed by: PHYSICIAN ASSISTANT

## 2020-05-20 RX ORDER — CYCLOBENZAPRINE HCL 10 MG
10 TABLET ORAL 3 TIMES DAILY PRN
Qty: 30 TABLET | Refills: 1 | Status: SHIPPED | OUTPATIENT
Start: 2020-05-20 | End: 2021-09-16
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
## 2020-06-01 ENCOUNTER — TREATMENT (OUTPATIENT)
Dept: PHYSICAL THERAPY | Facility: CLINIC | Age: 40
End: 2020-06-01

## 2020-06-01 DIAGNOSIS — M54.2 PAIN, NECK: Primary | ICD-10-CM

## 2020-06-01 PROCEDURE — 97110 THERAPEUTIC EXERCISES: CPT | Performed by: PHYSICAL THERAPIST

## 2020-06-01 PROCEDURE — 97162 PT EVAL MOD COMPLEX 30 MIN: CPT | Performed by: PHYSICAL THERAPIST
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
## 2020-06-05 ENCOUNTER — TREATMENT (OUTPATIENT)
Dept: PHYSICAL THERAPY | Facility: CLINIC | Age: 40
End: 2020-06-05

## 2020-06-05 ENCOUNTER — OFFICE VISIT (OUTPATIENT)
Dept: FAMILY MEDICINE CLINIC | Facility: CLINIC | Age: 40
End: 2020-06-05

## 2020-06-05 VITALS
DIASTOLIC BLOOD PRESSURE: 88 MMHG | TEMPERATURE: 97.5 F | WEIGHT: 315 LBS | OXYGEN SATURATION: 98 % | RESPIRATION RATE: 16 BRPM | HEART RATE: 90 BPM | SYSTOLIC BLOOD PRESSURE: 126 MMHG | BODY MASS INDEX: 44.1 KG/M2 | HEIGHT: 71 IN

## 2020-06-05 DIAGNOSIS — M54.2 PAIN, NECK: Primary | ICD-10-CM

## 2020-06-05 DIAGNOSIS — M54.2 NECK PAIN: Primary | ICD-10-CM

## 2020-06-05 PROCEDURE — 97140 MANUAL THERAPY 1/> REGIONS: CPT | Performed by: PHYSICAL THERAPIST

## 2020-06-05 PROCEDURE — 97035 APP MDLTY 1+ULTRASOUND EA 15: CPT | Performed by: PHYSICAL THERAPIST

## 2020-06-05 PROCEDURE — 99213 OFFICE O/P EST LOW 20 MIN: CPT | Performed by: PHYSICIAN ASSISTANT

## 2020-06-05 PROCEDURE — 97110 THERAPEUTIC EXERCISES: CPT | Performed by: PHYSICAL THERAPIST

## 2020-06-05 NOTE — PROGRESS NOTES
Physical Therapy Daily Progress Note  Visit: 2      Patient: Satish Will   : 1980  Referring practitioner: Mckinley López, *  Visit Diagnosis:     ICD-10-CM ICD-9-CM   1. Pain, neck M54.2 723.1     Date of Initial Visit: Type: THERAPY  Noted: 2020  Today's Date: 2020        Subjective     Satish Will reports: Pt reports pn along the L side of the neck. Has worked some on his HEP, makes his neck feel strained. Denies any tingling upon presenting to clinic today. Has been able to get mild relief from ice.    Treatment  Pre Treatment Pn Score: 3  Post Treatment Pn Score:  2    Exercise 1  Exercise Name 1: Repeated cervical extn, seated w/ towel  Sets/Reps 1: 10  Exercise 2  Exercise Name 2: seated UT stretch  Sets/Reps 2: 3  Time 2: 15 sec  Exercise 3  Exercise Name 3: supine OA nods  Sets/Reps 3: 10  Exercise 4  Exercise Name 4: supine OA nod w/ retraction  Sets/Reps 4: 10    Manual Rx 1  Manual Rx 1 Location: cervical  Manual Rx 1 Type: supine PA glides  Manual Rx 1 Grade: Gr I-II  Manual Rx 2  Manual Rx 2 Location: cervical  Manual Rx 2 Type: STM L cervical paraspinals    Ultrasound 53868  Location: L cervical  Rx Minutes: 8  Duty Cycle: 100  Frequency: 1.0 MHz  Intensity - Wts/cm: 1  Other Treatment Provided  Rx Minutes: 8  Duty Cycle: 100  Frequency: 1.0 MHz  Intensity - Wts/cm: 1   Objective       Assessment & Plan     Assessment  Assessment details: Pt compliant w/ his initial HEP. He has limited tolerance to manual therapy techniques due to tenderness over the L suboccipital region and mid cervical paraspinals, and tolerates only low level therex. He did however report decreased pn/tension in neck after thermal US. Discussed trial of heat prior to stretching, and added supine chin tucks to HEP. Cervical mobility unchanged.    Plan  Plan details: Continue w/ STM, US, cervical mobility exercises               Timed:  Manual Therapy:    15     mins  05623;  Therapeutic  Exercise:    20     mins  12728;     Neuromuscular Rajinder:    0    mins  75180;    Therapeutic Activity:     0     mins  39374;     Gait Trainin     mins  44429;     Ultrasound:     8     mins  34989;    Electrical Stimulation:    0     mins  43528 ( );    Untimed:  Electrical Stimulation:    0     mins  65954 ( );  Mechanical Traction:    0     mins  76549;     Timed Treatment:   43   mins   Total Treatment:     48   mins      Janis Smith, PT  Physical Therapist

## 2020-06-05 NOTE — PROGRESS NOTES
Subjective   Satish Will is a 40 y.o. male  Neck Pain (F/U. Has started PT. Needs FMLA forms completed.)      History of Present Illness  Patient is a pleasant 40-year-old white male who comes in complaint of neck pain patient has pain with flexion extension large mass stiffness and pain pain is worse with sitting standing but he can move he is been going to physical therapy which might make her worse but he states that he is continued in physical therapy at least until  The following portions of the patient's history were reviewed and updated as appropriate: allergies, current medications, past social history and problem list    Review of Systems   Constitutional: Negative for appetite change, diaphoresis, fatigue and unexpected weight change.   Eyes: Negative for visual disturbance.   Respiratory: Negative for cough, chest tightness and shortness of breath.    Cardiovascular: Negative for chest pain, palpitations and leg swelling.   Gastrointestinal: Negative for diarrhea, nausea and vomiting.   Endocrine: Negative for polydipsia, polyphagia and polyuria.   Musculoskeletal: Positive for back pain and neck pain.   Skin: Negative for color change and rash.   Neurological: Negative for dizziness, syncope, weakness, light-headedness, numbness and headaches.       Objective     Vitals:    06/05/20 0951   BP: 126/88   Pulse: 90   Resp: 16   Temp: 97.5 °F (36.4 °C)   SpO2: 98%       Physical Exam   Constitutional: He appears well-developed and well-nourished.   Neck: Neck supple. No JVD present. No thyromegaly present.   Cardiovascular: Normal rate, regular rhythm, normal heart sounds, intact distal pulses and normal pulses.   No murmur heard.  Pulmonary/Chest: Effort normal and breath sounds normal. No respiratory distress.   Abdominal: Soft. Bowel sounds are normal. There is no hepatosplenomegaly. There is no tenderness.   Musculoskeletal: He exhibits no edema.        Cervical back: He exhibits decreased range of  motion, tenderness and bony tenderness.   Lymphadenopathy:     He has no cervical adenopathy.   Neurological: No sensory deficit.   Skin: Skin is warm and dry. He is not diaphoretic.   Nursing note and vitals reviewed.      Assessment/Plan     Satish was seen today for neck pain.    Diagnoses and all orders for this visit:    Neck pain    #1 [place] 10/325 1 p.o. 4 times daily dispense 30    Range of motion exercises given    Continue physical therapy follow-up in 2 weeks

## 2020-06-10 ENCOUNTER — OFFICE VISIT (OUTPATIENT)
Dept: CARDIOLOGY | Facility: CLINIC | Age: 40
End: 2020-06-10

## 2020-06-10 ENCOUNTER — TREATMENT (OUTPATIENT)
Dept: PHYSICAL THERAPY | Facility: CLINIC | Age: 40
End: 2020-06-10

## 2020-06-10 VITALS
DIASTOLIC BLOOD PRESSURE: 82 MMHG | SYSTOLIC BLOOD PRESSURE: 134 MMHG | WEIGHT: 315 LBS | HEART RATE: 93 BPM | HEIGHT: 71 IN | TEMPERATURE: 96.2 F | OXYGEN SATURATION: 95 % | BODY MASS INDEX: 44.1 KG/M2

## 2020-06-10 DIAGNOSIS — I26.99 BILATERAL PULMONARY EMBOLISM (HCC): Primary | ICD-10-CM

## 2020-06-10 DIAGNOSIS — M54.2 PAIN, NECK: Primary | ICD-10-CM

## 2020-06-10 DIAGNOSIS — I27.20 PULMONARY HYPERTENSION (HCC): ICD-10-CM

## 2020-06-10 DIAGNOSIS — Z86.718 HISTORY OF DVT (DEEP VEIN THROMBOSIS): ICD-10-CM

## 2020-06-10 DIAGNOSIS — E66.01 MORBID OBESITY WITH BMI OF 50.0-59.9, ADULT (HCC): ICD-10-CM

## 2020-06-10 DIAGNOSIS — R06.02 SHORTNESS OF BREATH: ICD-10-CM

## 2020-06-10 PROCEDURE — 97035 APP MDLTY 1+ULTRASOUND EA 15: CPT | Performed by: PHYSICAL THERAPIST

## 2020-06-10 PROCEDURE — 97110 THERAPEUTIC EXERCISES: CPT | Performed by: PHYSICAL THERAPIST

## 2020-06-10 PROCEDURE — 99213 OFFICE O/P EST LOW 20 MIN: CPT | Performed by: INTERNAL MEDICINE

## 2020-06-10 PROCEDURE — 97140 MANUAL THERAPY 1/> REGIONS: CPT | Performed by: PHYSICAL THERAPIST

## 2020-06-10 NOTE — PROGRESS NOTES
[facility]    Patient ID: Satish Will is a 40 y.o. male.  : 1980   Contact: [phone]    Encounter date: 06/10/2020    PCP: Mckinley López PA      Chief complaint:   Chief Complaint   Patient presents with   • Pulmonary Hypertension       Problem List:  1. Bilateral PE:  a. CTA Chest 10/25/18: Extensive bilateral central pulmonary emboli.  b. Lifelong anticoagulation recommended  2. DVT:  a. BLE duplex, 10/26/2018: Acute RLE DVT common femoral; acute LLE DVT common/proximal femoral.  3. Pulmonary hypertension:  a. Echocardiogram, 10/26/2018: EF 55%. Mild concentric LVH. Mild TR with RVSP 76 mmHg.  4. RAFAEL  5. Surgical history:  a. Hernia repair    No Known Allergies    Current Medications:    Current Outpatient Medications:   •  apixaban (ELIQUIS) 5 MG tablet tablet, Take 1 tablet by mouth Every 12 (Twelve) Hours., Disp: 180 tablet, Rfl: 3  •  bisoprolol (ZEBeta) 5 MG tablet, Take 1 tablet by mouth Daily., Disp: 30 tablet, Rfl: 11  •  cyclobenzaprine (FLEXERIL) 10 MG tablet, Take 1 tablet by mouth 3 (Three) Times a Day As Needed for Muscle Spasms., Disp: 30 tablet, Rfl: 1  •  tadalafil (CIALIS) 20 MG tablet, Take one tablet by mouth every three days., Disp: 30 tablet, Rfl: 3  •  TiZANidine (ZANAFLEX) 2 MG capsule, Take 1 capsule by mouth 3 (Three) Times a Day., Disp: 30 capsule, Rfl: 1    HPI    Satish Will is a 40 y.o. male who presents today for annual follow up of bilateral PE/DVT and pulmonary hypertension. Since last visit, patient has been feeling well overall from a cardiovascular standpoint. Patient reports mild shortness of breath which patient relates to weight. Patient has been using his BiPAP machine at night. He does not have an exercise routine or strict diet.  He is not sure how many calories he eats every day.  Patient denies chest pain, palpitations, edema, dizziness, and syncope.       The following portions of the patient's  "history were reviewed and updated as appropriate: allergies, current medications and problem list.    Pertinent positives as listed in the HPI.  All other systems reviewed are negative.         Vitals:    06/10/20 1606   BP: 134/82   BP Location: Right arm   Patient Position: Sitting   Pulse: 93   Temp: 96.2 °F (35.7 °C)   SpO2: 95%   Weight: (!) 170 kg (375 lb)   Height: 180.3 cm (71\")       Physical Exam:  General: Alert and oriented.  Neck: Jugular venous pressure is within normal limits. Carotids have normal upstrokes without bruits.   Cardiovascular: Heart has a nondisplaced focal PMI. Regular rate and rhythm. No murmur, gallop or rub.  Lungs: Clear, no rales or wheezes. Equal expansion is noted.   Extremities: Show no edema.  Skin: Warm and dry.  Neurologic: Nonfocal.     Diagnostic Data (reviewed with patient):  Lab Results   Component Value Date    GLUCOSE 101 (H) 11/15/2019    BUN 14 11/15/2019    CREATININE 1.13 11/15/2019    EGFRIFAFRI 88 11/15/2019    BCR 12.4 11/15/2019     11/15/2019    K 4.4 11/15/2019     11/15/2019    CO2 28.0 11/15/2019    CALCIUM 9.3 11/15/2019    ALBUMIN 4.48 12/04/2018    ALKPHOS 60 12/04/2018    AST 19 12/04/2018    ALT 20 12/04/2018     Lab Results   Component Value Date    WBC 7.23 11/15/2019    RBC 5.81 (H) 11/15/2019    HGB 15.8 11/15/2019    HCT 50.2 11/15/2019    MCV 86.4 11/15/2019     11/15/2019        Procedures      Assessment:    ICD-10-CM ICD-9-CM   1. Bilateral pulmonary embolism (CMS/HCC) I26.99 415.19   2. Shortness of breath R06.02 786.05   3. Morbid obesity with BMI of 50.0-59.9, adult (CMS/HCC) E66.01 278.01    Z68.43 V85.43   4. Pulmonary hypertension (CMS/[facility]) I27.20 416.8   5. History of DVT (deep vein thrombosis) Z86.718 V12.51        Patient advised to determine his daily caloric intake and begin an exercise program with possible future consideration for bariatric surgery.      Plan:  1. Needs follow-up echocardiogram.  One was ordered " in 2018 but never performed.  2. Begin routine aerobic exercise, at least 30 minutes 5 days per week.  3. Patient advised to count calories and decrease intake by 30%. Significantly decrease or cut out carbohydrate intake from diet.   4. Continue bisoprolol for hypertension and rate control.  5. Continue Eliquis for DVT/ PE prevention.   6. Continue tadalafil for pulmonary hypertension.   7. Continue all other current medications.  8. F/up in 12 months, sooner if needed.      Scribed for Hilary Webster MD by Natali Lomeli. 6/10/2020  16:22     I Hilary Webster MD personally performed the services described in this documentation as scribed by the above individual in my presence, and it is both accurate and complete.    Hilary Webster MD, FACC

## 2020-06-10 NOTE — PROGRESS NOTES
Physical Therapy Daily Progress Note  Visit: 3      Patient: Satish Will   : 1980  Referring practitioner: Mckinley López, *  Visit Diagnosis:     ICD-10-CM ICD-9-CM   1. Pain, neck M54.2 723.1     Date of Initial Visit: Type: THERAPY  Noted: 2020  Today's Date: 6/10/2020        Subjective     Satish Will reports: Pt states that his neck felt much more relaxed after his last visit. Today however it seems to be about the same as before. He has tried working on supine chin tucks but they are uncomfortable. He has not yet tried heat as recommended.    Treatment  Pre Treatment Pn Score: 3  Post Treatment Pn Score:  2    Exercise 1  Exercise Name 1:    Sets/Reps 1: 2  Time: 15 sec  Exercise 2  Exercise Name 2: seated scap retraction  Sets/Reps 2: 10  Exercise 3  Exercise Name 3: seated cervical retraction  Sets/Reps 3: 10  Exercise 4  Exercise Name 4: instruction self STM    Manual Rx 1  Manual Rx 1 Location: cervical  Manual Rx 1 Type: ischemic compression/STM L UT, paraspinals  Manual Rx 2  Manual Rx 2 Location: cervical  Manual Rx 2 Type: suboccipital release    Ultrasound 39992  Location: L cervical  Rx Minutes: 8  Duty Cycle: 100  Frequency: 1.0 MHz  Intensity - Wts/cm: 1  Other Treatment Provided  Rx Minutes: 8  Duty Cycle: 100  Frequency: 1.0 MHz  Intensity - Wts/cm: 1   Objective          Active Range of Motion   Cervical/Thoracic Spine   Cervical    Flexion: 40 degrees   Extension: 22 degrees   Left lateral flexion: 23 (L cervical pn) degrees with pain  Right lateral flexion: 38 (pulling L cervical) degrees   Left rotation: 27 degrees with pain    Additional Active Range of Motion Details  L SB improved to 35 deg after tx          Assessment & Plan     Assessment  Assessment details: Pt had improved tolerance to manual therapy techniques today. Continued w/ thermal US and performed ischemic compression/STM over trigger points noted along L UT and cervical paraspinals. Pt  demonstrated 12 deg improvement in L SB w/ reduced pn upon B SB afterward. Cervical rotation, however, remains unchanged and painful to the L. Worked on seated postural correction, and pt was able to complete cervical retraction w/o pn today. Encouraged use of heat, self STM, and stretching at least 2-3x throughout the day.    Plan  Plan details: Assess ROM carryover and continue w/ focus on restoring full cervical mobility, postural strengthening.               Timed:  Manual Therapy:    15     mins  78873;  Therapeutic Exercise:    15     mins  37251;     Neuromuscular Rajinder:    0    mins  02543;    Therapeutic Activity:     0     mins  19763;     Gait Trainin     mins  79510;     Ultrasound:     8     mins  16993;    Electrical Stimulation:    0     mins  33848 ( );    Untimed:  Electrical Stimulation:    0     mins  02451 ( );  Mechanical Traction:    0     mins  87982;     Timed Treatment:   38   mins   Total Treatment:     45   mins      Janis Smith, PT  Physical Therapist

## 2020-06-11 ENCOUNTER — TELEPHONE (OUTPATIENT)
Dept: FAMILY MEDICINE CLINIC | Facility: CLINIC | Age: 40
End: 2020-06-11

## 2020-06-11 DIAGNOSIS — I27.20 PULMONARY HYPERTENSION (HCC): Primary | ICD-10-CM

## 2020-06-11 NOTE — TELEPHONE ENCOUNTER
Patient is requesting referral for new cardiologist because he is unhappy with Dr. Webster. He said that he had an appointment yesterday and doesn't have to follow up for one year.    I informed patient that his paperwork has been faxed.

## 2020-06-11 NOTE — TELEPHONE ENCOUNTER
PT CALLED AND IS REQUESTING A DIFFERENT HEMATOLOGIST     PT IS ALSO REQUESTING A CALL BACK REGARDING PAPERWORK    PLEASE ADVISE AT: [phone]

## 2020-06-25 ENCOUNTER — TREATMENT (OUTPATIENT)
Dept: PHYSICAL THERAPY | Facility: CLINIC | Age: 40
End: 2020-06-25

## 2020-06-25 DIAGNOSIS — M54.2 PAIN, NECK: Primary | ICD-10-CM

## 2020-06-25 PROCEDURE — 97110 THERAPEUTIC EXERCISES: CPT | Performed by: PHYSICAL THERAPIST

## 2020-06-25 PROCEDURE — 97140 MANUAL THERAPY 1/> REGIONS: CPT | Performed by: PHYSICAL THERAPIST

## 2020-06-25 NOTE — PROGRESS NOTES
Re-Assessment / Re-Certification      Patient: Satish Will   : 1980  Diagnosis/ICD-10 Code:  Pain, neck [M54.2]  Referring practitioner: Mckinley López, *  Date of Initial Visit: Type: THERAPY  Noted: 2020  Today's Date: 2020  Patient seen for 4 sessions      Subjective:   Subjective Questionnaire: NDI:  Clinical Progress: improved  Home Program Compliance: Yes  Treatment has included: therapeutic exercise, manual therapy and ultrasound    Subjective    Satish Will reports: Pt states that his neck pn has been a little better recently. Overall rates his symptoms as 80-85% improved. Pn seems to be random, located L lower cervical region, most pronounced by trying to rotate or laterally flex his head to the L. Denies pn in the upper back, denies numbness/tingling in fingertips.    Pre tx pn score: 0  Post tx pn score: 2      Treatment  Exercise 1  Exercise Name 1: Reassessment  Exercise 2  Exercise Name 2: seated L suboccipital stretch  Sets/Reps 2: 3  Time 2: 15 sec  Exercise 3  Exercise Name 3: L SB w/ towel  Sets/Reps 3: 10  Exercise 4  Exercise Name 4: seated cervical retraction  Sets/Reps 4: 15    Manual Rx 1  Manual Rx 1 Location: C4-5  Manual Rx 1 Type: prone central and R unilateral PA glides  Manual Rx 1 Grade: Gr I-II  Manual Rx 2  Manual Rx 2 Location: Cervical  Manual Rx 2 Type: supine intermittent distraction  Manual Rx 3  Manual Rx 3 Location: Cervical  Manual Rx 3 Type: passive retraction/extension              Objective          Postural Observations    Additional Postural Observation Details  FHP, increased CT kyphosis, protracted bilateral scapulae    Palpation   Left   Hypertonic in the suboccipitals.   Tenderness of the suboccipitals.     Neurological Testing     Sensation   Cervical/Thoracic   Left   Intact: light touch    Right   Intact: light touch    Reflexes   Left   Biceps (C5/C6): trace (1+)  Brachioradialis (C6): trace (1+)  Triceps (C7): absent  "(0)    Right   Biceps (C5/C6): trace (1+)  Brachioradialis (C6): trace (1+)  Triceps (C7): trace (1+)    Active Range of Motion   Cervical/Thoracic Spine   Cervical    Subcranial retraction: restricted and with pain   Flexion: 40 (\"pulling\" L cervical) degrees   Extension: 60 degrees   Left lateral flexion: 35 (pn along L UT) degrees with pain  Right lateral flexion: 45 degrees   Left rotation: 35 (pn L cervical) degrees with pain  Right rotation: 45 degrees     Passive Range of Motion     Additional Passive Range of Motion Details  Pn/hypomobility noted w/ L unilateral PAs over mid cervical segments    Strength/Myotome Testing     Additional Strength Details  B UE strength and ROM are WNL's.     Tests     Additional Tests Details  Quadrant (+) pn L C4-5 region    Cervical Flexibility Comments:   Upper trap: no impairment  Levator Scapula: mild impairment  Pec Major: mild impairment  Pec Minor: mild impairment  Suboccipitals: moderate impairment            Assessment & Plan     Assessment  Impairments: abnormal or restricted ROM and pain with function  Assessment details: Pt has progressed well w/ PT. He has made significant gains in cervical mobility and subjectively reports reduced pn, improved function, resolution of numbness/tingling. He continues to demonstrate mild restrictions in L SB and rotation ROM due to L mid/lower cervical facet irritation, along w/ suboccipital tightness and reduced cervical retraction ROM. He is making good progress toward goals and would benefit from continued PT services.  Prognosis: good  Functional Limitations: carrying objects, lifting and uncomfortable because of pain  Goals  Plan Goals: 4 weeks  Pt. demonstrates independence and compliance with initial HEP.-MET  Pt. reports reduction in pain intensity to no worse than 3/10 on NPRS.-MET  AROM of cervical spine shows improvement over baseline measures.-MET  Functional outcome measure is improved by 6%.-PROGRESSING  Pt " demonstrates awareness of correct sitting/standing posture.-MET    8 weeks  Pt. demonstrates independence in advanced HEP for ongoing improvement.-PROGRESSING  AROM of cervicothoracic spine is WNL's without increase in pain to allow performance of daily activities.-PROGRESSING              Plan  Therapy options: will be seen for skilled physical therapy services  Planned modality interventions: cryotherapy  Planned therapy interventions: manual therapy, postural training, soft tissue mobilization, spinal/joint mobilization, strengthening, stretching, therapeutic activities, home exercise program, functional ROM exercises, flexibility and body mechanics training  Frequency: 1x week  Duration in visits: 8  Treatment plan discussed with: patient  Plan details: Cont w/ current POC w/ emphasis on restoration of segmental cervical mobility, pn reduction, postural strengthening, suboccipital flexibility      Progress toward previous goals: Partially Met        PT Signature: Janis Smith, PT        Timed:  Manual Therapy:    24     mins  41228;  Therapeutic Exercise:    20     mins  90687;     Neuromuscular Rajinder:    0    mins  31742;    Therapeutic Activity:     0     mins  80108;     Gait Trainin     mins  94369;     Ultrasound:     0     mins  28689;    Electrical Stimulation:    0     mins  16392 ( );    Untimed:  Electrical Stimulation:    0     mins  56848 ( );  Mechanical Traction:    0     mins  99246;     Timed Treatment:   44   mins   Total Treatment:     48   mins

## 2020-07-01 ENCOUNTER — TREATMENT (OUTPATIENT)
Dept: PHYSICAL THERAPY | Facility: CLINIC | Age: 40
End: 2020-07-01

## 2020-07-01 DIAGNOSIS — M54.2 PAIN, NECK: Primary | ICD-10-CM

## 2020-07-01 PROCEDURE — 97140 MANUAL THERAPY 1/> REGIONS: CPT | Performed by: PHYSICAL THERAPIST

## 2020-07-01 PROCEDURE — 97110 THERAPEUTIC EXERCISES: CPT | Performed by: PHYSICAL THERAPIST

## 2020-07-01 NOTE — PROGRESS NOTES
Physical Therapy Daily Progress Note  Visit: 5      Patient: Satish Will   : 1980  Referring practitioner: Mckinley López, *  Visit Diagnosis:     ICD-10-CM ICD-9-CM   1. Pain, neck M54.2 723.1     Date of Initial Visit: Type: THERAPY  Noted: 2020  Today's Date: 2020        Subjective     Satish Will reports: Pt denies any pn today, has not noticed much pn since he last came to PT. He still feels apprehensive to turn his head to the L in fear of causing pn.    Treatment  Pre Treatment Pn Score: 0  Post Treatment Pn Score:  1    Exercise 1  Exercise Name 1: L SB w/ towel  Sets/Reps 1: 10  Exercise 2  Exercise Name 2: L suboccipital stretch  Sets/Reps 2: 3  Time 2: 15 sec  Exercise 3  Exercise Name 3: instruction in self SNAG-L Rotation    Manual Rx 1  Manual Rx 1 Location: C4-5  Manual Rx 1 Type: prone central and R unilateral PA glides  Manual Rx 1 Grade: Gr I-II  Manual Rx 2  Manual Rx 2 Location: Cervical  Manual Rx 2 Type: supine intermittent distraction        Objective          Active Range of Motion   Cervical/Thoracic Spine   Cervical    Flexion: 55 degrees   Extension: 60 degrees   Left lateral flexion: 45 degrees   Right lateral flexion: 45 degrees           Assessment & Plan     Assessment  Assessment details: Pt demonstrates restored cervical mobility in all planes. He reports only slight pn at end range L SB, but mild pn at end range L rotation. Continued w/ MT techniques aimed at improving segmental mobility and instructed pt in self SNAG using towel to restore pn free rotation ROM. Pt reported decreased TTP throughout the L cervical paraspinals as well as decreased pn w/ PA gliding today.     Plan  Plan details: Assess response to self SNAG technique.               Timed:  Manual Therapy:    15     mins  08762;  Therapeutic Exercise:    10     mins  45626;     Neuromuscular Rajinder:    0    mins  83304;    Therapeutic Activity:     0     mins  51393;     Gait  Trainin     mins  43997;     Ultrasound:     0     mins  06855;    Electrical Stimulation:    0     mins  20949 ( );    Untimed:  Electrical Stimulation:    0     mins  85508 ( );  Mechanical Traction:    0     mins  59516;     Timed Treatment:   25   mins   Total Treatment:     30   mins      Janis Smith, PT  Physical Therapist

## 2020-07-16 ENCOUNTER — HOSPITAL ENCOUNTER (OUTPATIENT)
Dept: CARDIOLOGY | Facility: HOSPITAL | Age: 40
Discharge: HOME | End: 2020-07-16
Admitting: INTERNAL MEDICINE

## 2020-07-16 VITALS — BODY MASS INDEX: 44.1 KG/M2 | HEIGHT: 71 IN | WEIGHT: 315 LBS

## 2020-07-16 DIAGNOSIS — I27.20 PULMONARY HYPERTENSION (HCC): ICD-10-CM

## 2020-07-16 PROCEDURE — 93306 TTE W/DOPPLER COMPLETE: CPT | Performed by: INTERNAL MEDICINE

## 2020-07-16 PROCEDURE — 93306 TTE W/DOPPLER COMPLETE: CPT

## 2020-07-17 LAB
BH CV ECHO MEAS - AO MAX PG (FULL): 4 MMHG
BH CV ECHO MEAS - AO MAX PG: 7.8 MMHG
BH CV ECHO MEAS - AO MEAN PG (FULL): 2.5 MMHG
BH CV ECHO MEAS - AO MEAN PG: 4.3 MMHG
BH CV ECHO MEAS - AO V2 MAX: 139.8 CM/SEC
BH CV ECHO MEAS - AO V2 MEAN: 97 CM/SEC
BH CV ECHO MEAS - AO V2 VTI: 26.3 CM
BH CV ECHO MEAS - AVA(I,A): 3.4 CM^2
BH CV ECHO MEAS - AVA(I,D): 3.4 CM^2
BH CV ECHO MEAS - AVA(V,A): 3.2 CM^2
BH CV ECHO MEAS - AVA(V,D): 3.2 CM^2
BH CV ECHO MEAS - BSA(HAYCOCK): 3 M^2
BH CV ECHO MEAS - BSA: 2.8 M^2
BH CV ECHO MEAS - BZI_BMI: 52.3 KILOGRAMS/M^2
BH CV ECHO MEAS - BZI_METRIC_HEIGHT: 180.3 CM
BH CV ECHO MEAS - BZI_METRIC_WEIGHT: 170.1 KG
BH CV ECHO MEAS - EDV(CUBED): 102.9 ML
BH CV ECHO MEAS - EDV(MOD-SP2): 114 ML
BH CV ECHO MEAS - EDV(MOD-SP4): 114 ML
BH CV ECHO MEAS - EDV(TEICH): 101.7 ML
BH CV ECHO MEAS - EF(CUBED): 68.7 %
BH CV ECHO MEAS - EF(MOD-BP): 56 %
BH CV ECHO MEAS - EF(MOD-SP2): 54.4 %
BH CV ECHO MEAS - EF(MOD-SP4): 58.8 %
BH CV ECHO MEAS - EF(TEICH): 60.3 %
BH CV ECHO MEAS - ESV(CUBED): 32.2 ML
BH CV ECHO MEAS - ESV(MOD-SP2): 52 ML
BH CV ECHO MEAS - ESV(MOD-SP4): 47 ML
BH CV ECHO MEAS - ESV(TEICH): 40.4 ML
BH CV ECHO MEAS - FS: 32.1 %
BH CV ECHO MEAS - IVS/LVPW: 0.9
BH CV ECHO MEAS - IVSD: 0.98 CM
BH CV ECHO MEAS - LA DIMENSION: 4.3 CM
BH CV ECHO MEAS - LAD MAJOR: 4.8 CM
BH CV ECHO MEAS - LAT PEAK E' VEL: 12.9 CM/SEC
BH CV ECHO MEAS - LATERAL E/E' RATIO: 6.3
BH CV ECHO MEAS - LV DIASTOLIC VOL/BSA (35-75): 41.4 ML/M^2
BH CV ECHO MEAS - LV MASS(C)D: 172.3 GRAMS
BH CV ECHO MEAS - LV MASS(C)DI: 62.6 GRAMS/M^2
BH CV ECHO MEAS - LV MAX PG: 3.9 MMHG
BH CV ECHO MEAS - LV MEAN PG: 1.8 MMHG
BH CV ECHO MEAS - LV SYSTOLIC VOL/BSA (12-30): 17.1 ML/M^2
BH CV ECHO MEAS - LV V1 MAX: 98.2 CM/SEC
BH CV ECHO MEAS - LV V1 MEAN: 61.2 CM/SEC
BH CV ECHO MEAS - LV V1 VTI: 20 CM
BH CV ECHO MEAS - LVIDD: 4.7 CM
BH CV ECHO MEAS - LVIDS: 3.2 CM
BH CV ECHO MEAS - LVLD AP2: 8.2 CM
BH CV ECHO MEAS - LVLD AP4: 8.3 CM
BH CV ECHO MEAS - LVLS AP2: 6.7 CM
BH CV ECHO MEAS - LVLS AP4: 7.3 CM
BH CV ECHO MEAS - LVOT AREA (M): 4.5 CM^2
BH CV ECHO MEAS - LVOT AREA: 4.5 CM^2
BH CV ECHO MEAS - LVOT DIAM: 2.4 CM
BH CV ECHO MEAS - LVPWD: 1 CM
BH CV ECHO MEAS - MED PEAK E' VEL: 12.5 CM/SEC
BH CV ECHO MEAS - MEDIAL E/E' RATIO: 6.5
BH CV ECHO MEAS - MV A MAX VEL: 63.7 CM/SEC
BH CV ECHO MEAS - MV DEC TIME: 0.23 SEC
BH CV ECHO MEAS - MV E MAX VEL: 82.4 CM/SEC
BH CV ECHO MEAS - MV E/A: 1.3
BH CV ECHO MEAS - PA ACC SLOPE: 889.4 CM/SEC^2
BH CV ECHO MEAS - PA ACC TIME: 0.1 SEC
BH CV ECHO MEAS - PA MAX PG: 4 MMHG
BH CV ECHO MEAS - PA PR(ACCEL): 33.1 MMHG
BH CV ECHO MEAS - PA V2 MAX: 100.4 CM/SEC
BH CV ECHO MEAS - SI(CUBED): 25.7 ML/M^2
BH CV ECHO MEAS - SI(LVOT): 32.6 ML/M^2
BH CV ECHO MEAS - SI(MOD-SP2): 22.5 ML/M^2
BH CV ECHO MEAS - SI(MOD-SP4): 24.3 ML/M^2
BH CV ECHO MEAS - SI(TEICH): 22.2 ML/M^2
BH CV ECHO MEAS - SV(CUBED): 70.7 ML
BH CV ECHO MEAS - SV(LVOT): 89.9 ML
BH CV ECHO MEAS - SV(MOD-SP2): 62 ML
BH CV ECHO MEAS - SV(MOD-SP4): 67 ML
BH CV ECHO MEAS - SV(TEICH): 61.3 ML
BH CV ECHO MEASUREMENTS AVERAGE E/E' RATIO: 6.49
BH CV VAS BP RIGHT ARM: NORMAL MMHG
BH CV XLRA - RV BASE: 4.2 CM
BH CV XLRA - RV LENGTH: 7.9 CM
BH CV XLRA - RV MID: 3.5 CM
LEFT ATRIUM VOLUME INDEX: 12.7 ML/M^2
LEFT ATRIUM VOLUME: 35 ML
LV EF 2D ECHO EST: 55 %
MAXIMAL PREDICTED HEART RATE: 181 BPM
STRESS TARGET HR: 154 BPM

## 2020-07-28 ENCOUNTER — TELEPHONE (OUTPATIENT)
Dept: FAMILY MEDICINE CLINIC | Facility: CLINIC | Age: 40
End: 2020-07-28

## 2020-07-28 ENCOUNTER — TREATMENT (OUTPATIENT)
Dept: PHYSICAL THERAPY | Facility: CLINIC | Age: 40
End: 2020-07-28

## 2020-07-28 DIAGNOSIS — M54.2 PAIN, NECK: Primary | ICD-10-CM

## 2020-07-28 PROCEDURE — 97110 THERAPEUTIC EXERCISES: CPT | Performed by: PHYSICAL THERAPIST

## 2020-07-28 NOTE — PROGRESS NOTES
Physical Therapy Daily Progress Note  Visit: 6      Patient: Satsih Will   : 1980  Referring practitioner: Mckinley López, *  Visit Diagnosis:     ICD-10-CM ICD-9-CM   1. Pain, neck M54.2 723.1     Date of Initial Visit: Type: THERAPY  Noted: 2020  Today's Date: 2020        Subjective     Satish Will reports: Pt reports that neck pn remains minimal. However his neck seems to fatigue easily at work, especially w/ repetitive use of his arms.     Treatment  Pre Treatment Pn Score: 0  Post Treatment Pn Score:  2 (fatigue)    Exercise 1  Exercise Name 1: chin tuck w/ retraction  Sets/Reps 1: 10  Time: 5 sec hold  Exercise 2  Exercise Name 2: scap slides at wall  Sets/Reps 2: 15  Exercise 3  Exercise Name 3: rows  Equipment/Resistance 3: GTB  Sets/Reps 3: 15  Exercise 4  Exercise Name 4: lat pulls  Equipment/Resistance 4: GTB  Sets/Reps 4: 15  Exercise 5  Exercise Name 5: LS stretch  Sets/Reps 5: 3  Time 5: 15 sec  Exercise 6  Exercise Name 6: rhomboid/mid trap stretch  Sets/Reps 6: 3  Time 6: 15 sec             Objective       Assessment & Plan     Assessment  Assessment details: Pt continues to report ongoing relief of neck pn, and has maintained full cervical mobility. However he now complains of weakness, fatigue of the L cervical and shoulder musculature w/ work activities. Shifted focus today to postural strengthening exercises. He reported fatigue quickly but denied any pn. Issued updated HEP including TB rows, scap slides at wall, and posterior shoulder stretch.    Plan  Plan details: Reassessment               Timed:  Manual Therapy:    0     mins  33846;  Therapeutic Exercise:    28     mins  30061;     Neuromuscular Rajinder:    0    mins  63866;    Therapeutic Activity:     0     mins  48085;     Gait Trainin     mins  33541;     Ultrasound:     0     mins  11763;    Electrical Stimulation:    0     mins  32225 ( );    Untimed:  Electrical Stimulation:    0      mins  85107 ( );  Mechanical Traction:    0     mins  36992;     Timed Treatment:   28   mins   Total Treatment:     32   mins      Janis Smith, PT  Physical Therapist

## 2020-07-28 NOTE — TELEPHONE ENCOUNTER
PT CALLED STATED THAT PAPER WORK THAT HE NEEDS TO HAVE FILLED OUT FOR FMLA FOR ACCDIENT AND NEED UPDATED PAPERWORK THAT HE IS ONLY ABLE TO WORK 40 HOURS A WEEK B/C HE STILL HAVE P/T DUE TO NEW  ACCIDENT.    PLEASE ADVISE.  CALL BACK:[phone]

## 2020-08-03 ENCOUNTER — TELEPHONE (OUTPATIENT)
Dept: FAMILY MEDICINE CLINIC | Facility: CLINIC | Age: 40
End: 2020-08-03

## 2020-08-03 NOTE — TELEPHONE ENCOUNTER
Patient dropped off paperwork for FMLA  On 07/29/20 and was inquiring that when it was filled out and completed if it could be faxed to [phone] at the attention of Queenie Head.  Patient indicated it was time -sensitive and would like a call when it was faxed.  Please advise      Satish Will call back [phone]

## 2020-08-17 ENCOUNTER — TREATMENT (OUTPATIENT)
Dept: PHYSICAL THERAPY | Facility: CLINIC | Age: 40
End: 2020-08-17

## 2020-08-17 DIAGNOSIS — M54.2 PAIN, NECK: Primary | ICD-10-CM

## 2020-08-17 PROCEDURE — 97110 THERAPEUTIC EXERCISES: CPT | Performed by: PHYSICAL THERAPIST

## 2020-08-17 NOTE — PROGRESS NOTES
Re-Assessment / Re-Certification      Patient: Satish Will   : 1980  Diagnosis/ICD-10 Code:  Pain, neck [M54.2]  Referring practitioner: Mckinley López, *  Date of Initial Visit: Type: THERAPY  Noted: 2020  Today's Date: 2020  Patient seen for 7 sessions      Subjective:   Subjective Questionnaire: NDI:  Clinical Progress: improved  Home Program Compliance: Yes  Treatment has included: therapeutic exercise and manual therapy    Subjective    Satish Will reports: Pt reports that he has been doing very well overall. The only neck pn he has experienced was after sleeping awkwardly one night, but that pn has since subsided. He no longer feels like he fatigues w/ work activities. He agrees his symptoms have returned to baseline.    Pre tx pn score: 0  Post tx pn score: 0      Treatment  Exercise 1  Exercise Name 1: Reassessment/review HEP                   Objective          Palpation   Left   No palpable tenderness to the cervical paraspinals, suboccipitals and upper trapezius.     Neurological Testing     Sensation   Cervical/Thoracic   Left   Intact: light touch    Right   Intact: light touch    Reflexes   Left   Biceps (C5/C6): trace (1+)  Brachioradialis (C6): trace (1+)  Triceps (C7): trace (1+)    Right   Biceps (C5/C6): trace (1+)  Brachioradialis (C6): trace (1+)  Triceps (C7): trace (1+)    Active Range of Motion   Cervical/Thoracic Spine   Cervical    Flexion: 55 degrees   Extension: 60 degrees   Left lateral flexion: 55 degrees   Right lateral flexion: 55 degrees   Left rotation: 60 degrees   Right rotation: 60 degrees     Passive Range of Motion     Additional Passive Range of Motion Details  PIVM cervical segments intact and w/o pn    Strength/Myotome Testing     Left Shoulder     Planes of Motion   Flexion: 5   Abduction: 5   External rotation at 0°: 5   Internal rotation at 0°: 5     Right Shoulder     Planes of Motion   Flexion: 5   Abduction: 5   External rotation at  0°: 5   Internal rotation at 0°: 5     Left Elbow   Flexion: 5  Extension: 5    Right Elbow   Flexion: 5  Extension: 5    Additional Strength Details  B UE strength and ROM are WNL's.      Tests     Additional Tests Details  Quadrant (-)    Cervical Flexibility Comments:   Upper trap: no impairment  Levator Scapula: no impairment  Pec Major: mild impairment  Pec Minor: mild impairment  Suboccipitals: mild impairment            Assessment & Plan     Assessment  Assessment details: Pt has progressed well w/ PT. He exhibits restored and pain free cervical/shoulder mobility. Deep neck flexor strength restored. He denies tenderness to palpation throughout the cervical/shoulder region. Quadrant testing (-). He exhibits full UE strength. He reports 100% improvement in his symptoms and denies any limitations w/ daily or work activities. He has met all goals set for PT and is appropriate to d/c to independent HEP and self mgmt.  Prognosis: good    Goals  Plan Goals: 4 weeks  Pt. demonstrates independence and compliance with initial HEP.-MET  Pt. reports reduction in pain intensity to no worse than 3/10 on NPRS.-MET  AROM of cervical spine shows improvement over baseline measures.-MET  Functional outcome measure is improved by 6%.-MET  Pt demonstrates awareness of correct sitting/standing posture.-MET    8 weeks  Pt. demonstrates independence in advanced HEP for ongoing improvement.-MET  AROM of cervicothoracic spine is WNL's without increase in pain to allow performance of daily activities.-MET              Plan  Therapy options: will not be seen for skilled physical therapy services  Treatment plan discussed with: patient  Plan details: D/c to HEP      Progress toward previous goals: All Met        PT Signature: Janis Smith, PT        Timed:  Manual Therapy:    0     mins  51328;  Therapeutic Exercise:    30     mins  22688;     Neuromuscular Rajinder:    0    mins  68725;    Therapeutic Activity:     0     mins  37297;      Gait Trainin     mins  20762;     Ultrasound:     0     mins  18768;    Electrical Stimulation:    0     mins  74172 ( );    Untimed:  Electrical Stimulation:    0     mins  53634 ( );  Mechanical Traction:    0     mins  54583;     Timed Treatment:   30   mins   Total Treatment:     30   mins

## 2020-09-16 ENCOUNTER — TELEPHONE (OUTPATIENT)
Dept: FAMILY MEDICINE CLINIC | Facility: CLINIC | Age: 40
End: 2020-09-16

## 2020-09-16 NOTE — TELEPHONE ENCOUNTER
I spoke with patient and he requested that work restriction form be faxed again.    Attn: ROSY Dewey [phone]

## 2020-09-16 NOTE — TELEPHONE ENCOUNTER
PT. WOULD LIKE SHARA ALFONSO, TO CONTACT HIM BACK @ [phone], RE. [employer]USH[employer] PAPERWORK FOR FIT FOR DUTY FORM, FAXED OVER TO [employer].  ([employer]).

## 2021-02-05 DIAGNOSIS — N52.9 ERECTILE DYSFUNCTION, UNSPECIFIED ERECTILE DYSFUNCTION TYPE: Primary | ICD-10-CM

## 2021-02-05 RX ORDER — TADALAFIL 20 MG/1
TABLET ORAL
Qty: 30 TABLET | Refills: 3 | Status: SHIPPED | OUTPATIENT
Start: 2021-02-05 | End: 2022-03-08 | Stop reason: SDUPTHER

## 2021-02-05 NOTE — TELEPHONE ENCOUNTER
Caller: Satish Will    Relationship: Self    Best call back number:[phone]  Medication needed:   Requested Prescriptions     Pending Prescriptions Disp Refills   • tadalafil (Cialis) 20 MG tablet 30 tablet 3     Sig: Take one tablet by mouth every three days.       When do you need the refill by: ASAP    What details did the patient provide when requesting the medication:     Does the patient have less than a 3 day supply:  [x] Yes  [] No    What is the patient's preferred pharmacy:        [facility] [address] AT [facility] [address] [address] - [phone]  - [phone] FX

## 2021-02-11 DIAGNOSIS — R07.9 CHEST PAIN, UNSPECIFIED TYPE: ICD-10-CM

## 2021-02-11 RX ORDER — BISOPROLOL FUMARATE 5 MG/1
5 TABLET, FILM COATED ORAL
Qty: 30 TABLET | Refills: 11 | Status: SHIPPED | OUTPATIENT
Start: 2021-02-11 | End: 2022-03-02 | Stop reason: SDUPTHER

## 2021-04-01 ENCOUNTER — TELEPHONE (OUTPATIENT)
Dept: FAMILY MEDICINE CLINIC | Facility: CLINIC | Age: 41
End: 2021-04-01

## 2021-04-01 NOTE — TELEPHONE ENCOUNTER
Caller: Satish Will    Relationship: Self    Best call back number: [phone]    What form or medical record are you requesting: PATIENT'S MOTHER IS DROPPING OFF PAPERWORK THAT [employer] HAS FILLED OUT BEFORE    Who is requesting this form or medical record from you: EMPLOYER    How would you like to receive the form or medical records (pick-up, mail, fax):  ONCE COMPLETED  If fax, what is the fax number:   If mail, what is the address:   If pick-up, provide patient with address and location details    Timeframe paperwork needed: ASAP     Additional notes:

## 2021-04-02 ENCOUNTER — TELEPHONE (OUTPATIENT)
Dept: FAMILY MEDICINE CLINIC | Facility: CLINIC | Age: 41
End: 2021-04-02

## 2021-04-02 NOTE — TELEPHONE ENCOUNTER
Caller: Satish Will    Relationship: Self    Best call back number: [phone]    What form or medical record are you requesting:UPDATE FOR JOB    Who is requesting this form or medical record from you: PATIENT     How would you like to receive the form or medical records (pick-up, mail, fax): PICK- UP     Timeframe paperwork needed: AS SOON AS POSSIBLE     Additional notes: PATIENT CALLED BACK TO FOLLOW UP ON PAPERWORK THAT WAS DROPPED OFF YESTERDAY.  PLEASE CALL AND ADVISE WHEN READY TO BE PICKED UP.

## 2021-06-17 ENCOUNTER — TELEPHONE (OUTPATIENT)
Dept: FAMILY MEDICINE CLINIC | Facility: CLINIC | Age: 41
End: 2021-06-17

## 2021-06-17 NOTE — TELEPHONE ENCOUNTER
"Caller: Satish Will    Relationship: Self    Best call back number:    [phone]    What form or medical record are you requesting:     PATIENT STATED HIS EMPLOYMENT NEEDS LIST OF PRESCRIPTION ORDERS DATED BACK TO 5/1/2019    PATIENT STATED THE ONLY MEDICATION ON THAT LIST THAT NEEDS DIAGNOSIS IS THE MEDICATION LISTED BELOW:    TiZANidine (ZANAFLEX) 2 MG capsule    Who is requesting this form or medical record from you:     PATIENT REQUESTED THE LIST BE FAXED TO THE NUMBER BELOW:    FAX:    [phone]    How would you like to receive the form or medical records (pick-up, mail, fax):    Timeframe paperwork needed:    PATIENT STATED HIS EMPLOYER NEEDS THE RECORDS ASAP    Additional notes:     PATIENT REQUESTED FOR MESSAGE TO INCLUDE, \"BAYLEE, YOU'RE AWESOME\"    MANNY TRINIDAD      "

## 2021-06-21 NOTE — TELEPHONE ENCOUNTER
List of current meds have been faxed. I told patient that these are the only medications that our office has prescribed but he had others that were administered while in the hospital.

## 2021-06-22 ENCOUNTER — TELEPHONE (OUTPATIENT)
Dept: FAMILY MEDICINE CLINIC | Facility: CLINIC | Age: 41
End: 2021-06-22

## 2021-07-06 NOTE — TELEPHONE ENCOUNTER
PATIENT IS CALLING TO CHECK THE STATUS OF THE RECORDS HE REQUESTED BE FAXED TO HIM.  THE FAX # [phone].

## 2021-09-16 ENCOUNTER — OFFICE VISIT (OUTPATIENT)
Dept: FAMILY MEDICINE CLINIC | Facility: CLINIC | Age: 41
End: 2021-09-16

## 2021-09-16 VITALS
SYSTOLIC BLOOD PRESSURE: 144 MMHG | RESPIRATION RATE: 16 BRPM | BODY MASS INDEX: 44.1 KG/M2 | OXYGEN SATURATION: 98 % | HEART RATE: 116 BPM | WEIGHT: 315 LBS | DIASTOLIC BLOOD PRESSURE: 96 MMHG | HEIGHT: 71 IN | TEMPERATURE: 96.9 F

## 2021-09-16 DIAGNOSIS — R53.83 FATIGUE, UNSPECIFIED TYPE: ICD-10-CM

## 2021-09-16 DIAGNOSIS — I27.20 PULMONARY HYPERTENSION (HCC): ICD-10-CM

## 2021-09-16 DIAGNOSIS — R25.2 LEG CRAMPS: Primary | ICD-10-CM

## 2021-09-16 PROCEDURE — 99213 OFFICE O/P EST LOW 20 MIN: CPT | Performed by: PHYSICIAN ASSISTANT

## 2021-09-16 RX ORDER — TIZANIDINE 2 MG/1
4 TABLET ORAL NIGHTLY PRN
Qty: 20 TABLET | Refills: 0 | Status: SHIPPED | OUTPATIENT
Start: 2021-09-16 | End: 2022-03-02

## 2021-09-16 NOTE — PROGRESS NOTES
Subjective   Satish Will is a 41 y.o. male  Leg Pain (BLE, cramps) and Foot Pain (Bilat, feet are sore, swollen in evenings )      History of Present Illness  Patient is a pleasant 41-year-old white male who comes in complaining of bilateral leg cramps foot pain cramping been going on for last couple months he states that he feels tired fatigue no energy cramping is worse at night and legs.    Patient like to be referred to a new cardiologist was not happy with his care would like to see Dr. Ramsey for pulmonary hypertension he states he is doing well but needs a follow-up appointment with cardiology  The following portions of the patient's history were reviewed and updated as appropriate: allergies, current medications, past social history and problem list    Review of Systems   Constitutional: Negative for fatigue and unexpected weight change.   Respiratory: Negative for cough, chest tightness and shortness of breath.    Cardiovascular: Negative for chest pain, palpitations and leg swelling.   Gastrointestinal: Negative for nausea.   Skin: Negative for color change and rash.   Neurological: Negative for dizziness, syncope, weakness and headaches.       Objective     Vitals:    09/16/21 1541   BP: 144/96   Pulse: 116   Resp: 16   Temp: 96.9 °F (36.1 °C)   SpO2: 98%       Physical Exam  Constitutional:       Appearance: He is well-developed.   HENT:      Head: Normocephalic and atraumatic.      Right Ear: External ear normal.      Left Ear: External ear normal.      Nose: Nose normal.   Eyes:      Conjunctiva/sclera: Conjunctivae normal.      Pupils: Pupils are equal, round, and reactive to light.   Neck:      Thyroid: No thyromegaly.   Cardiovascular:      Rate and Rhythm: Normal rate and regular rhythm.      Heart sounds: Normal heart sounds. No murmur heard.     Pulmonary:      Effort: Pulmonary effort is normal.      Breath sounds: Normal breath sounds.   Abdominal:      General: Bowel sounds are normal.       Palpations: Abdomen is soft. There is no mass.      Tenderness: There is no abdominal tenderness.   Genitourinary:     Penis: Normal.       Prostate: Normal.      Rectum: Normal.   Musculoskeletal:         General: Normal range of motion.      Cervical back: Normal range of motion and neck supple.   Skin:     General: Skin is warm and dry.   Neurological:      Mental Status: He is alert and oriented to person, place, and time.      Cranial Nerves: No cranial nerve deficit.      Deep Tendon Reflexes: Reflexes are normal and symmetric.         Assessment/Plan     Diagnoses and all orders for this visit:    1. Leg cramps (Primary)  -     Comprehensive metabolic panel; Future    2. Fatigue, unspecified type  -     TSH; Future    3. Pulmonary hypertension (CMS/HCC)  -     Ambulatory Referral to Cardiology    Other orders  -     tiZANidine (ZANAFLEX) 2 MG tablet; Take 2 tablets by mouth At Night As Needed for Muscle Spasms.  Dispense: 20 tablet; Refill: 0     I spent 15 minutes in patient care: Reviewing records prior to the visit, examining the patient, entering orders and documentation    Please note that portions of this document were completed with a voice recognition program. Efforts were made to edit the dictations, but occasionally words are mis-transcribed

## 2021-10-01 ENCOUNTER — OFFICE VISIT (OUTPATIENT)
Dept: FAMILY MEDICINE CLINIC | Facility: CLINIC | Age: 41
End: 2021-10-01

## 2021-10-01 ENCOUNTER — LAB (OUTPATIENT)
Dept: LAB | Facility: HOSPITAL | Age: 41
End: 2021-10-01

## 2021-10-01 VITALS
HEIGHT: 71 IN | BODY MASS INDEX: 44.1 KG/M2 | RESPIRATION RATE: 16 BRPM | SYSTOLIC BLOOD PRESSURE: 138 MMHG | WEIGHT: 315 LBS | OXYGEN SATURATION: 99 % | TEMPERATURE: 96.8 F | HEART RATE: 85 BPM | DIASTOLIC BLOOD PRESSURE: 90 MMHG

## 2021-10-01 DIAGNOSIS — R25.2 LEG CRAMPS: Primary | ICD-10-CM

## 2021-10-01 DIAGNOSIS — R25.2 LEG CRAMPS: ICD-10-CM

## 2021-10-01 DIAGNOSIS — R53.83 FATIGUE, UNSPECIFIED TYPE: ICD-10-CM

## 2021-10-01 LAB
ALBUMIN SERPL-MCNC: 4.4 G/DL (ref 3.5–5.2)
ALBUMIN/GLOB SERPL: 1.3 G/DL
ALP SERPL-CCNC: 70 U/L (ref 39–117)
ALT SERPL W P-5'-P-CCNC: 24 U/L (ref 1–41)
ANION GAP SERPL CALCULATED.3IONS-SCNC: 9.5 MMOL/L (ref 5–15)
AST SERPL-CCNC: 23 U/L (ref 1–40)
BILIRUB SERPL-MCNC: 0.5 MG/DL (ref 0–1.2)
BUN SERPL-MCNC: 17 MG/DL (ref 6–20)
BUN/CREAT SERPL: 16.2 (ref 7–25)
CALCIUM SPEC-SCNC: 9.2 MG/DL (ref 8.6–10.5)
CHLORIDE SERPL-SCNC: 100 MMOL/L (ref 98–107)
CO2 SERPL-SCNC: 27.5 MMOL/L (ref 22–29)
CREAT SERPL-MCNC: 1.05 MG/DL (ref 0.76–1.27)
GFR SERPL CREATININE-BSD FRML MDRD: 95 ML/MIN/1.73
GLOBULIN UR ELPH-MCNC: 3.5 GM/DL
GLUCOSE SERPL-MCNC: 84 MG/DL (ref 65–99)
POTASSIUM SERPL-SCNC: 4.1 MMOL/L (ref 3.5–5.2)
PROT SERPL-MCNC: 7.9 G/DL (ref 6–8.5)
SODIUM SERPL-SCNC: 137 MMOL/L (ref 136–145)
TSH SERPL DL<=0.05 MIU/L-ACNC: 0.85 UIU/ML (ref 0.27–4.2)

## 2021-10-01 PROCEDURE — 99213 OFFICE O/P EST LOW 20 MIN: CPT | Performed by: PHYSICIAN ASSISTANT

## 2021-10-01 PROCEDURE — 36415 COLL VENOUS BLD VENIPUNCTURE: CPT

## 2021-10-01 PROCEDURE — 80053 COMPREHEN METABOLIC PANEL: CPT

## 2021-10-01 PROCEDURE — 84443 ASSAY THYROID STIM HORMONE: CPT

## 2021-10-01 RX ORDER — CYCLOBENZAPRINE HCL 10 MG
10 TABLET ORAL 2 TIMES DAILY PRN
Qty: 30 TABLET | Refills: 1 | Status: SHIPPED | OUTPATIENT
Start: 2021-10-01 | End: 2022-03-02

## 2021-10-01 NOTE — PROGRESS NOTES
Subjective   Satish Will is a 41 y.o. male  Leg Pain      History of Present Illness  Patient is a pleasant 41-year-old male who comes in complaint of bilateral leg pain leg cramps large stiffness he has pain with walking large stiffness and pain in lower leg extremities weight exercise did not get his lab work on previous visit  The following portions of the patient's history were reviewed and updated as appropriate: allergies, current medications, past social history and problem list    Review of Systems   Constitutional: Negative for appetite change, diaphoresis, fatigue and unexpected weight change.   Eyes: Negative for visual disturbance.   Respiratory: Negative for cough, chest tightness and shortness of breath.    Cardiovascular: Negative for chest pain, palpitations and leg swelling.   Gastrointestinal: Negative for diarrhea, nausea and vomiting.   Endocrine: Negative for polydipsia, polyphagia and polyuria.   Musculoskeletal:        Leg cramps   Skin: Negative for color change and rash.   Neurological: Negative for dizziness, syncope, weakness, light-headedness, numbness and headaches.       Objective     Vitals:    10/01/21 1144   BP: 138/90   Pulse: 85   Resp: 16   Temp: 96.8 °F (36 °C)   SpO2: 99%       Physical Exam  Vitals and nursing note reviewed.   Constitutional:       Appearance: He is well-developed.   Neck:      Vascular: No JVD.   Cardiovascular:      Rate and Rhythm: Normal rate and regular rhythm.      Pulses: Normal pulses.      Heart sounds: Normal heart sounds. No murmur heard.     Pulmonary:      Effort: Pulmonary effort is normal. No respiratory distress.      Breath sounds: Normal breath sounds.   Abdominal:      General: Bowel sounds are normal.      Palpations: Abdomen is soft.      Tenderness: There is no abdominal tenderness.   Musculoskeletal:        Legs:    Skin:     General: Skin is warm and dry.         Assessment/Plan     Diagnoses and all orders for this visit:    1.  Leg cramps (Primary)  -     cyclobenzaprine (FLEXERIL) 10 MG tablet; Take 1 tablet by mouth 2 (Two) Times a Day As Needed for Muscle Spasms.  Dispense: 30 tablet; Refill: 1     I spent 15 minutes in patient care: Reviewing records prior to the visit, examining the patient, entering orders and documentation    Please note that portions of this document were completed with a voice recognition program. Efforts were made to edit the dictations, but occasionally words are mis-transcribed

## 2021-11-08 ENCOUNTER — TELEPHONE (OUTPATIENT)
Dept: FAMILY MEDICINE CLINIC | Facility: CLINIC | Age: 41
End: 2021-11-08

## 2021-11-08 NOTE — TELEPHONE ENCOUNTER
Caller: Satish Will Shane    Relationship: Self    Best call back number: [phone]    What form or medical record are you requesting: WORK LETTER    Who is requesting this form or medical record from you: [facility]    How would you like to receive the form or medical records (pick-up, mail, fax):    If fax, what is the fax number: [phone] ATTN: LUCY PERALES    Timeframe paperwork needed: ASAP    Additional notes: PATIENT CALLING IN TO CHECK ON THE WORK PAPER HE NEEDED MARIBEL CASTILLO TO SIGN. PLEASE CHECK ON THIS AND FAX TO THE ABOVE NUMBER.

## 2022-03-02 ENCOUNTER — OFFICE VISIT (OUTPATIENT)
Dept: FAMILY MEDICINE CLINIC | Facility: CLINIC | Age: 42
End: 2022-03-02

## 2022-03-02 VITALS
SYSTOLIC BLOOD PRESSURE: 140 MMHG | BODY MASS INDEX: 44.1 KG/M2 | HEIGHT: 71 IN | RESPIRATION RATE: 18 BRPM | DIASTOLIC BLOOD PRESSURE: 86 MMHG | OXYGEN SATURATION: 95 % | HEART RATE: 93 BPM | WEIGHT: 315 LBS | TEMPERATURE: 97.3 F

## 2022-03-02 DIAGNOSIS — G89.29 CHRONIC PAIN OF LEFT KNEE: ICD-10-CM

## 2022-03-02 DIAGNOSIS — M25.562 CHRONIC PAIN OF LEFT KNEE: ICD-10-CM

## 2022-03-02 DIAGNOSIS — E56.9 VITAMIN DEFICIENCY: ICD-10-CM

## 2022-03-02 DIAGNOSIS — I10 PRIMARY HYPERTENSION: ICD-10-CM

## 2022-03-02 DIAGNOSIS — R21 RASH AND NONSPECIFIC SKIN ERUPTION: ICD-10-CM

## 2022-03-02 DIAGNOSIS — R53.83 FATIGUE, UNSPECIFIED TYPE: Primary | ICD-10-CM

## 2022-03-02 PROCEDURE — 99214 OFFICE O/P EST MOD 30 MIN: CPT | Performed by: PHYSICIAN ASSISTANT

## 2022-03-02 RX ORDER — BISOPROLOL FUMARATE 5 MG/1
5 TABLET, FILM COATED ORAL
Qty: 30 TABLET | Refills: 11 | Status: SHIPPED | OUTPATIENT
Start: 2022-03-02

## 2022-03-02 RX ORDER — BETAMETHASONE DIPROPIONATE 0.5 MG/G
1 CREAM TOPICAL 2 TIMES DAILY
Qty: 15 G | Refills: 1 | Status: SHIPPED | OUTPATIENT
Start: 2022-03-02 | End: 2023-03-23

## 2022-03-02 NOTE — PROGRESS NOTES
Subjective   Satish Will is a 41 y.o. male  Hypertension (RF bisoprolol) and Knee Pain (F/U, leg cramps)      History of Present Illness  The patient has consented to being recorded using CHRISTINE.    The patient reports that he has been monitoring his blood pressure in the morning and it has been elevated. He denies any shortness of breath; however, he has been experiencing occasional headaches.     The patient has gained 16 pounds since his previous visit. He has been eating a lot of chicken and fish. He reports does not like canned vegetables, therefore he eats frozen vegetables. He has increased his water intake.    The patient has been taking vitamin D 50,000 units 1 weekly for 6 weeks. He notes feeling fatigued, that has improved.     He reports that he has been experiencing lateral left leg cramps, exacerbated with prolonged weightbearing. He reports that he has been occasionally taking Flexeril, with improvement.     He reports a rash on his bilateral arms. He was on Otezla in the past, which caused diarrhea.     The following portions of the patient's history were reviewed and updated as appropriate: allergies, current medications, past social history and problem list    Review of Systems   Constitutional: Negative for appetite change, diaphoresis, fatigue and unexpected weight change.   Eyes: Negative for visual disturbance.   Respiratory: Negative for cough, chest tightness and shortness of breath.    Cardiovascular: Negative for chest pain, palpitations and leg swelling.   Gastrointestinal: Negative for diarrhea, nausea and vomiting.   Endocrine: Negative for polydipsia, polyphagia and polyuria.   Skin: Positive for rash. Negative for color change.        Rash present on bilateral arms.   Neurological: Negative for dizziness, syncope, weakness, light-headedness, numbness and headaches.       Objective     Vitals:    03/02/22 1550   BP: 140/86   Pulse: 93   Resp: 18   Temp: 97.3 °F (36.3 °C)   SpO2: 95%        Physical Exam  Vitals and nursing note reviewed.   Constitutional:       General: He is not in acute distress.     Appearance: Normal appearance. He is well-developed. He is not ill-appearing, toxic-appearing or diaphoretic.   Neck:      Thyroid: No thyromegaly.      Vascular: No carotid bruit or JVD.   Cardiovascular:      Rate and Rhythm: Normal rate and regular rhythm.      Pulses: Normal pulses.      Heart sounds: Normal heart sounds. No murmur heard.      Pulmonary:      Effort: Pulmonary effort is normal. No respiratory distress.      Breath sounds: Normal breath sounds.   Abdominal:      Palpations: Abdomen is soft. There is no mass.      Tenderness: There is no abdominal tenderness.   Musculoskeletal:      Cervical back: Neck supple.   Lymphadenopathy:      Cervical: No cervical adenopathy.   Skin:     General: Skin is warm and dry.      Findings: Rash present.      Comments: Rash on bilateral forearms, consistent with eczema.    Neurological:      Mental Status: He is alert.      Sensory: No sensory deficit.         Assessment/Plan     Diagnoses and all orders for this visit:    1. Fatigue, unspecified type (Primary)  -     Vitamin D 25 hydroxy; Future  -     Comprehensive metabolic panel; Future    2. Vitamin deficiency  Comments:  - I will order blood work to check his vitamin D levels.     3. Chronic pain of left knee  Comments:  - I advised him to continue taking Flexeril.     4. Primary hypertension  Comments:  - His blood pressure is elevated in the office today at 140/86 mmHg.  - Monitor blood pressure in the morning when he has a headache.  Orders:  -     bisoprolol (ZEBeta) 5 MG tablet; Take 1 tablet by mouth Daily.  Dispense: 30 tablet; Refill: 11    5. Rash and nonspecific skin eruption  Comments:  - I will prescribe him a topical cream.   Orders:  -     betamethasone, augmented, (Diprolene AF) 0.05 % cream; Apply 1 application topically to the appropriate area as directed 2 (Two) Times a  Day.  Dispense: 15 g; Refill: 1     Morbid obesity      -  Educated patient on weight loss, exercise, and diet modification.       - Advised him to try to loose 10 pounds prior to his upcoming follow-up.   I spent 15 minutes in patient care: Reviewing records prior to the visit, examining the patient, entering orders and documentation    Part of this note may be an electronic transcription/translation of spoken language to printed text using the Dragon Dictation System.      Transcribed from ambient dictation for MANNY Vivar by Jose Mackenzie.  03/02/22   17:18 EST    Patient verbalized consent to the visit recording.  I have personally performed the services described in this document as transcribed by the above individual, and it is both accurate and complete.  MANNY Vivar  3/3/2022  09:20 EST

## 2022-03-08 DIAGNOSIS — N52.9 ERECTILE DYSFUNCTION, UNSPECIFIED ERECTILE DYSFUNCTION TYPE: ICD-10-CM

## 2022-03-08 RX ORDER — TADALAFIL 20 MG/1
TABLET ORAL
Qty: 30 TABLET | Refills: 3 | Status: SHIPPED | OUTPATIENT
Start: 2022-03-08

## 2022-03-08 NOTE — TELEPHONE ENCOUNTER
Caller: Satish Will    Relationship: Self    Best call back number: [phone]    Requested Prescriptions:   Requested Prescriptions     Pending Prescriptions Disp Refills   • tadalafil (Cialis) 20 MG tablet 30 tablet 3     Sig: Take one tablet by mouth every three days.        Pharmacy where request should be sent: [facility] [address] AT [facility] [address] - [phone]  - [phone]     Additional details provided by patient: PLEASE SEND IN A NEW PRESCRIPTION    Does the patient have less than a 3 day supply:  [x] Yes  [] No    Ashley Romero, PCT   03/08/22 11:43 EST

## 2022-10-26 ENCOUNTER — TELEPHONE (OUTPATIENT)
Dept: FAMILY MEDICINE CLINIC | Facility: CLINIC | Age: 42
End: 2022-10-26

## 2022-10-26 NOTE — TELEPHONE ENCOUNTER
Caller: Satish Will    Relationship: Self    Best call back number:  [phone]    Requested Prescriptions:   Requested Prescriptions      No prescriptions requested or ordered in this encounter      apixaban (ELIQUIS) 5 MG tablet tablet      Pharmacy where request should be sent:      [facility] - [address] - [phone]  - [phone] FX     Additional details provided by patient:     PATIENT LAST VISIT 3/2/22    Does the patient have less than a 3 day supply:  [x] Yes  [] No    Latha Johns Rep   10/26/22 09:07 EDT

## 2023-01-18 NOTE — PATIENT INSTRUCTIONS
Reinforced current HEP and discussed exercise progression. Issued blue TB. Recommended pt continue w/ HEP 2-3x/wk  
Karlos Everett

## 2023-03-23 ENCOUNTER — OFFICE VISIT (OUTPATIENT)
Dept: FAMILY MEDICINE CLINIC | Facility: CLINIC | Age: 43
End: 2023-03-23
Payer: COMMERCIAL

## 2023-03-23 ENCOUNTER — HOSPITAL ENCOUNTER (EMERGENCY)
Facility: HOSPITAL | Age: 43
Discharge: HOME | End: 2023-03-23
Attending: EMERGENCY MEDICINE | Admitting: EMERGENCY MEDICINE
Payer: COMMERCIAL

## 2023-03-23 ENCOUNTER — APPOINTMENT (OUTPATIENT)
Dept: CT IMAGING | Facility: HOSPITAL | Age: 43
End: 2023-03-23
Payer: COMMERCIAL

## 2023-03-23 VITALS
HEART RATE: 78 BPM | RESPIRATION RATE: 20 BRPM | OXYGEN SATURATION: 99 % | TEMPERATURE: 98.1 F | HEIGHT: 71 IN | DIASTOLIC BLOOD PRESSURE: 97 MMHG | SYSTOLIC BLOOD PRESSURE: 158 MMHG | BODY MASS INDEX: 44.1 KG/M2 | WEIGHT: 315 LBS

## 2023-03-23 VITALS
SYSTOLIC BLOOD PRESSURE: 136 MMHG | OXYGEN SATURATION: 100 % | BODY MASS INDEX: 44.1 KG/M2 | WEIGHT: 315 LBS | HEIGHT: 71 IN | HEART RATE: 78 BPM | RESPIRATION RATE: 18 BRPM | DIASTOLIC BLOOD PRESSURE: 86 MMHG

## 2023-03-23 DIAGNOSIS — R07.9 ACUTE CHEST PAIN: Primary | ICD-10-CM

## 2023-03-23 DIAGNOSIS — Z86.79 HISTORY OF HYPERTENSION: ICD-10-CM

## 2023-03-23 DIAGNOSIS — M54.6 ACUTE RIGHT-SIDED THORACIC BACK PAIN: ICD-10-CM

## 2023-03-23 DIAGNOSIS — Z86.711 HISTORY OF PULMONARY EMBOLISM: ICD-10-CM

## 2023-03-23 DIAGNOSIS — R07.81 PLEURITIC CHEST PAIN: Primary | ICD-10-CM

## 2023-03-23 DIAGNOSIS — R53.1 WEAKNESS: ICD-10-CM

## 2023-03-23 DIAGNOSIS — R59.9 ENLARGED LYMPH NODE: ICD-10-CM

## 2023-03-23 LAB
ALBUMIN SERPL-MCNC: 4.2 G/DL (ref 3.5–5.2)
ALBUMIN/GLOB SERPL: 1.1 G/DL
ALP SERPL-CCNC: 71 U/L (ref 39–117)
ALT SERPL W P-5'-P-CCNC: 19 U/L (ref 1–41)
ANION GAP SERPL CALCULATED.3IONS-SCNC: 11 MMOL/L (ref 5–15)
AST SERPL-CCNC: 22 U/L (ref 1–40)
BASOPHILS # BLD AUTO: 0.03 10*3/MM3 (ref 0–0.2)
BASOPHILS NFR BLD AUTO: 0.4 % (ref 0–1.5)
BILIRUB SERPL-MCNC: 0.4 MG/DL (ref 0–1.2)
BUN SERPL-MCNC: 17 MG/DL (ref 6–20)
BUN/CREAT SERPL: 14.2 (ref 7–25)
CALCIUM SPEC-SCNC: 8.9 MG/DL (ref 8.6–10.5)
CHLORIDE SERPL-SCNC: 98 MMOL/L (ref 98–107)
CO2 SERPL-SCNC: 27 MMOL/L (ref 22–29)
CREAT BLDA-MCNC: 1.4 MG/DL
CREAT BLDA-MCNC: 1.4 MG/DL (ref 0.6–1.3)
CREAT SERPL-MCNC: 1.2 MG/DL (ref 0.76–1.27)
DEPRECATED RDW RBC AUTO: 42.2 FL (ref 37–54)
EGFRCR SERPLBLD CKD-EPI 2021: 77.9 ML/MIN/1.73
EOSINOPHIL # BLD AUTO: 0.06 10*3/MM3 (ref 0–0.4)
EOSINOPHIL NFR BLD AUTO: 0.7 % (ref 0.3–6.2)
ERYTHROCYTE [DISTWIDTH] IN BLOOD BY AUTOMATED COUNT: 13.6 % (ref 12.3–15.4)
GEN 5 2HR TROPONIN T REFLEX: <6 NG/L
GLOBULIN UR ELPH-MCNC: 3.7 GM/DL
GLUCOSE SERPL-MCNC: 93 MG/DL (ref 65–99)
HCT VFR BLD AUTO: 46.9 % (ref 37.5–51)
HGB BLD-MCNC: 15 G/DL (ref 13–17.7)
HOLD SPECIMEN: NORMAL
IMM GRANULOCYTES # BLD AUTO: 0.02 10*3/MM3 (ref 0–0.05)
IMM GRANULOCYTES NFR BLD AUTO: 0.2 % (ref 0–0.5)
LIPASE SERPL-CCNC: 35 U/L (ref 13–60)
LYMPHOCYTES # BLD AUTO: 2.45 10*3/MM3 (ref 0.7–3.1)
LYMPHOCYTES NFR BLD AUTO: 29.8 % (ref 19.6–45.3)
MCH RBC QN AUTO: 27.2 PG (ref 26.6–33)
MCHC RBC AUTO-ENTMCNC: 32 G/DL (ref 31.5–35.7)
MCV RBC AUTO: 85.1 FL (ref 79–97)
MONOCYTES # BLD AUTO: 0.69 10*3/MM3 (ref 0.1–0.9)
MONOCYTES NFR BLD AUTO: 8.4 % (ref 5–12)
NEUTROPHILS NFR BLD AUTO: 4.97 10*3/MM3 (ref 1.7–7)
NEUTROPHILS NFR BLD AUTO: 60.5 % (ref 42.7–76)
NRBC BLD AUTO-RTO: 0 /100 WBC (ref 0–0.2)
NT-PROBNP SERPL-MCNC: 17.8 PG/ML (ref 0–450)
PLATELET # BLD AUTO: 274 10*3/MM3 (ref 140–450)
PMV BLD AUTO: 9 FL (ref 6–12)
POTASSIUM SERPL-SCNC: 4 MMOL/L (ref 3.5–5.2)
PROT SERPL-MCNC: 7.9 G/DL (ref 6–8.5)
RBC # BLD AUTO: 5.51 10*6/MM3 (ref 4.14–5.8)
SODIUM SERPL-SCNC: 136 MMOL/L (ref 136–145)
TROPONIN T DELTA: NORMAL
TROPONIN T SERPL HS-MCNC: <6 NG/L
WBC NRBC COR # BLD: 8.22 10*3/MM3 (ref 3.4–10.8)
WHOLE BLOOD HOLD COAG: NORMAL
WHOLE BLOOD HOLD SPECIMEN: NORMAL

## 2023-03-23 PROCEDURE — 83880 ASSAY OF NATRIURETIC PEPTIDE: CPT | Performed by: EMERGENCY MEDICINE

## 2023-03-23 PROCEDURE — 84484 ASSAY OF TROPONIN QUANT: CPT | Performed by: EMERGENCY MEDICINE

## 2023-03-23 PROCEDURE — 99283 EMERGENCY DEPT VISIT LOW MDM: CPT

## 2023-03-23 PROCEDURE — 83690 ASSAY OF LIPASE: CPT | Performed by: EMERGENCY MEDICINE

## 2023-03-23 PROCEDURE — 85025 COMPLETE CBC W/AUTO DIFF WBC: CPT | Performed by: EMERGENCY MEDICINE

## 2023-03-23 PROCEDURE — 93005 ELECTROCARDIOGRAM TRACING: CPT

## 2023-03-23 PROCEDURE — 25510000001 IOPAMIDOL PER 1 ML: Performed by: EMERGENCY MEDICINE

## 2023-03-23 PROCEDURE — 71275 CT ANGIOGRAPHY CHEST: CPT

## 2023-03-23 PROCEDURE — 80053 COMPREHEN METABOLIC PANEL: CPT | Performed by: EMERGENCY MEDICINE

## 2023-03-23 PROCEDURE — 99212 OFFICE O/P EST SF 10 MIN: CPT | Performed by: PHYSICIAN ASSISTANT

## 2023-03-23 PROCEDURE — 93005 ELECTROCARDIOGRAM TRACING: CPT | Performed by: EMERGENCY MEDICINE

## 2023-03-23 PROCEDURE — 36415 COLL VENOUS BLD VENIPUNCTURE: CPT

## 2023-03-23 PROCEDURE — 82565 ASSAY OF CREATININE: CPT

## 2023-03-23 RX ORDER — OMEPRAZOLE 20 MG/1
1 CAPSULE, DELAYED RELEASE ORAL DAILY
COMMUNITY
Start: 2022-12-20

## 2023-03-23 RX ORDER — ASPIRIN 81 MG/1
324 TABLET, CHEWABLE ORAL ONCE
Status: COMPLETED | OUTPATIENT
Start: 2023-03-23 | End: 2023-03-23

## 2023-03-23 RX ORDER — SODIUM CHLORIDE 0.9 % (FLUSH) 0.9 %
10 SYRINGE (ML) INJECTION AS NEEDED
Status: DISCONTINUED | OUTPATIENT
Start: 2023-03-23 | End: 2023-03-23 | Stop reason: HOSPADM

## 2023-03-23 RX ADMIN — IOPAMIDOL 75 ML: 755 INJECTION, SOLUTION INTRAVENOUS at 13:52

## 2023-03-23 RX ADMIN — ASPIRIN 81 MG 324 MG: 81 TABLET ORAL at 14:06

## 2023-03-23 RX ADMIN — IOPAMIDOL 75 ML: 755 INJECTION, SOLUTION INTRAVENOUS at 13:33

## 2023-03-23 NOTE — ED PROVIDER NOTES
[facility]    EMERGENCY DEPARTMENT ENCOUNTER      Pt Name: Satish Will  MRN: [id number]  YOB: 1980  Date of evaluation: 3/23/2023  Provider: Javid Rodriguez MD    CHIEF COMPLAINT       Chief Complaint   Patient presents with   • Chest Pain   • Back Pain         HISTORY OF PRESENT ILLNESS   Satish Will is a 42 y.o. male who presents to the emergency department with moderate aching right-sided upper back pain and right chest wall pain over the course the past couple days it is worse with deep breathing and with movement.  He denies any associated shortness of breath, vomiting, or diaphoresis.  No history of cardiac disease but does have history of PE for which he takes Eliquis and he endorses compliance with this.  No recent travel or surgery.  Denies any pain or swelling in his legs.  No abdominal pain, fever, chills, or cough.      Nursing notes were reviewed.    REVIEW OF SYSTEMS     ROS:  A chief complaint appropriate review of systems was completed and is negative except as noted in the HPI.      PAST MEDICAL HISTORY     Past Medical History:   Diagnosis Date   • Deep vein thrombosis (HCC) 10/24/2018   • Hypertension    • Obesity    • RAFAEL on CPAP    • Psoriasis    • Pulmonary embolism (HCC) 10/24/2018         SURGICAL HISTORY       Past Surgical History:   Procedure Laterality Date   • UMBILICAL HERNIA REPAIR N/A 11/20/2019    Procedure: UMBILICAL HERNIA REPAIR WITH MESH;  Surgeon: Uday Merino MD;  Location: [facility];  Service: General   • WISDOM TOOTH EXTRACTION           CURRENT MEDICATIONS       Current Facility-Administered Medications:   •  sodium chloride 0.9 % flush 10 mL, 10 mL, Intravenous, PRN, Javid Rodriguez MD    Current Outpatient Medications:   •  apixaban (ELIQUIS) 5 MG tablet tablet, Take 1 tablet by mouth Every 12 (Twelve) Hours. Indications: DVT/PE (active thrombosis), Disp: 180 tablet, Rfl: 3  •  bisoprolol (ZEBeta) 5 MG tablet, Take 1 tablet by mouth Daily.,  "Disp: 30 tablet, Rfl: 11  •  omeprazole (priLOSEC) 20 MG capsule, Take 1 capsule by mouth Daily., Disp: , Rfl:   •  tadalafil (Cialis) 20 MG tablet, Take one tablet by mouth every three days., Disp: 30 tablet, Rfl: 3    ALLERGIES     Patient has no known allergies.    FAMILY HISTORY       Family History   Problem Relation Age of Onset   • Hypertension Mother    • Hypertension Maternal Grandmother    • Heart disease Maternal Grandmother    • Cancer Paternal Grandmother           SOCIAL HISTORY       Social History     Socioeconomic History   • Marital status: Single   Tobacco Use   • Smoking status: Some Days     Types: Cigars   • Smokeless tobacco: Never   • Tobacco comments:     rare cigar use 1/month    Substance and Sexual Activity   • Alcohol use: Yes     Comment: Occassionally   • Drug use: No   • Sexual activity: Yes     Partners: Female         PHYSICAL EXAM    (up to 7 for level 4, 8 or more for level 5)     Vitals:    03/23/23 1200 03/23/23 1403 03/23/23 1403   BP: 155/98 165/100 158/97   BP Location: Left arm Left arm Right arm   Patient Position: Sitting     Pulse: 78     Resp: 20     Temp: 98.1 °F (36.7 °C)     TempSrc: Oral     SpO2: 99%     Weight: (!) 175 kg (385 lb)     Height: 180.3 cm (71\")         General: Awake, alert, no acute distress.  HEENT: Conjunctivae normal.  Neck: Trachea midline.  Cardiac: Heart regular rate, rhythm, no murmurs, rubs, or gallops  Lungs: Lungs are clear to auscultation, there is no wheezing, rhonchi, or rales. There is no use of accessory muscles.  Chest wall: Moderate tenderness to the right chest wall  Abdomen: Abdomen is soft, nontender, nondistended. There are no firm or pulsatile masses, no rebound rigidity or guarding.   Musculoskeletal: No deformity.  Moderate tenderness to the right upper back  Neuro: Alert and oriented x 4.  Dermatology: Skin is warm and dry  Psych: Mentation is grossly normal, cognition is grossly normal. Affect is " appropriate.        DIAGNOSTIC RESULTS     EKG: All EKGs are interpreted by the Emergency Department Physician who either signs or Co-signs this chart in the absence of a cardiologist.    ECG 12 Lead ED Triage Standing Order; Chest Pain   Preliminary Result   Test Reason : ED Triage Standing Order~   Blood Pressure :   */*   mmHG   Vent. Rate :  75 BPM     Atrial Rate :  75 BPM      P-R Int : 142 ms          QRS Dur :  80 ms       QT Int : 398 ms       P-R-T Axes :  35  37  41 degrees      QTc Int : 444 ms      Normal sinus rhythm   Normal ECG   When compared with ECG of 23-MAR-2023 12:07, (Unconfirmed)   No significant change was found      Referred By: EDMD           Confirmed By:       ECG 12 Lead ED Triage Standing Order; Chest Pain   Preliminary Result   Test Reason : ED Triage Standing Order~   Blood Pressure :   */*   mmHG   Vent. Rate :  76 BPM     Atrial Rate :  76 BPM      P-R Int : 162 ms          QRS Dur :  76 ms       QT Int : 388 ms       P-R-T Axes :  40  22  12 degrees      QTc Int : 436 ms      Normal sinus rhythm   Normal ECG   When compared with ECG of 15-NOV-2019 09:37,   No significant change was found      Referred By: EDMD           Confirmed By:             RADIOLOGY:   [x] Radiologist's Report Reviewed:  CT Angiogram Chest   Final Result   Impression:      1. No acute intrathoracic pathology. No evidence of pulmonary embolism.   2. There is a mildly prominent left axillary/left upper outer quadrant nodule best seen on image #58. Recommend correlation with axillary ultrasound for further evaluation.      Electronically Signed: Filippo Hernández     3/23/2023 2:25 PM EDT     Workstation ID: [id number]          I ordered and independently reviewed the above noted radiographic studies.        LABS:    I have reviewed and interpreted all of the currently available lab results from this visit (if applicable):  Results for orders placed or performed during the hospital encounter of 03/23/23   High  Sensitivity Troponin T    Specimen: Blood   Result Value Ref Range    HS Troponin T <6 <15 ng/L   Comprehensive Metabolic Panel    Specimen: Blood   Result Value Ref Range    Glucose 93 65 - 99 mg/dL    BUN 17 6 - 20 mg/dL    Creatinine 1.20 0.76 - 1.27 mg/dL    Sodium 136 136 - 145 mmol/L    Potassium 4.0 3.5 - 5.2 mmol/L    Chloride 98 98 - 107 mmol/L    CO2 27.0 22.0 - 29.0 mmol/L    Calcium 8.9 8.6 - 10.5 mg/dL    Total Protein 7.9 6.0 - 8.5 g/dL    Albumin 4.2 3.5 - 5.2 g/dL    ALT (SGPT) 19 1 - 41 U/L    AST (SGOT) 22 1 - 40 U/L    Alkaline Phosphatase 71 39 - 117 U/L    Total Bilirubin 0.4 0.0 - 1.2 mg/dL    Globulin 3.7 gm/dL    A/G Ratio 1.1 g/dL    BUN/Creatinine Ratio 14.2 7.0 - 25.0    Anion Gap 11.0 5.0 - 15.0 mmol/L    eGFR 77.9 >60.0 mL/min/1.73   Lipase    Specimen: Blood   Result Value Ref Range    Lipase 35 13 - 60 U/L   BNP    Specimen: Blood   Result Value Ref Range    proBNP 17.8 0.0 - 450.0 pg/mL   CBC Auto Differential    Specimen: Blood   Result Value Ref Range    WBC 8.22 3.40 - 10.80 10*3/mm3    RBC 5.51 4.14 - 5.80 10*6/mm3    Hemoglobin 15.0 13.0 - 17.7 g/dL    Hematocrit 46.9 37.5 - 51.0 %    MCV 85.1 79.0 - 97.0 fL    MCH 27.2 26.6 - 33.0 pg    MCHC 32.0 31.5 - 35.7 g/dL    RDW 13.6 12.3 - 15.4 %    RDW-SD 42.2 37.0 - 54.0 fl    MPV 9.0 6.0 - 12.0 fL    Platelets 274 140 - 450 10*3/mm3    Neutrophil % 60.5 42.7 - 76.0 %    Lymphocyte % 29.8 19.6 - 45.3 %    Monocyte % 8.4 5.0 - 12.0 %    Eosinophil % 0.7 0.3 - 6.2 %    Basophil % 0.4 0.0 - 1.5 %    Immature Grans % 0.2 0.0 - 0.5 %    Neutrophils, Absolute 4.97 1.70 - 7.00 10*3/mm3    Lymphocytes, Absolute 2.45 0.70 - 3.10 10*3/mm3    Monocytes, Absolute 0.69 0.10 - 0.90 10*3/mm3    Eosinophils, Absolute 0.06 0.00 - 0.40 10*3/mm3    Basophils, Absolute 0.03 0.00 - 0.20 10*3/mm3    Immature Grans, Absolute 0.02 0.00 - 0.05 10*3/mm3    nRBC 0.0 0.0 - 0.2 /100 WBC   High Sensitivity Troponin T 2Hr    Specimen: Blood   Result Value Ref Range     HS Troponin T <6 <15 ng/L    Troponin T Delta     POCT, Creatinine    Specimen: Blood   Result Value Ref Range    Creatinine 1.40 mg/dL   POC Creatinine    Specimen: Blood   Result Value Ref Range    Creatinine 1.40 (H) 0.60 - 1.30 mg/dL   ECG 12 Lead ED Triage Standing Order; Chest Pain   Result Value Ref Range    QT Interval 388 ms    QTC Interval 436 ms   ECG 12 Lead ED Triage Standing Order; Chest Pain   Result Value Ref Range    QT Interval 398 ms    QTC Interval 444 ms   Green Top (Gel)   Result Value Ref Range    Extra Tube Hold for add-ons.    Lavender Top   Result Value Ref Range    Extra Tube hold for add-on    Gold Top - SST   Result Value Ref Range    Extra Tube Hold for add-ons.    Light Blue Top   Result Value Ref Range    Extra Tube Hold for add-ons.         If labs were ordered, I independently reviewed the results and considered them in treating the patient.      EMERGENCY DEPARTMENT COURSE and DIFFERENTIAL DIAGNOSIS/MDM:   Vitals:  AS OF 15:40 EDT    BP - 158/97  HR - 78  TEMP - 98.1 °F (36.7 °C) (Oral)  O2 SATS - 99%        Discussion below represents my analysis of pertinent findings related to patient's condition, differential diagnosis, treatment plan and final disposition.      Differential diagnosis:  The differential diagnosis associated with the patient's presentation includes: PE, pneumonia, pneumothorax, ACS, pericardial effusion, pleural effusion, hemothorax, ACS      Independent interpretations (ECG/rhythm strip/X-ray/US/CT scan): I independently interpreted the patient's CTA chest and see no evidence of pulmonary embolus.  I dependently interpreted the patient's cardiac monitoring and see sinus rhythm without evidence of dysrhythmia.        Patient's care impacted by:   [] Diabetes   [] Hypertension   [] Coronary Artery Disease   [] Cancer   [x] Other: Morbid obesity, history of PE    Care significantly affected by Social Determinants of Health (housing and economic circumstances,  unemployment)    [] Yes     [x] No   If yes, Patient's care significantly limited by  Social Determinants of Health including:    [] Inadequate housing    [] Low income    [] Alcoholism and drug addiction in family    [] Problems related to primary support group    [] Unemployment    [] Problems related to employment    [] Other Social Determinants of Health:           I considered prescription management with:    [x] Pain medication: Consider management of this patient's pain with hydrocodone or oxycodone, however feel it is most likely musculoskeletal in nature and would benefit from more conservative management.   [] Antiviral:   [] Antibiotic:   [] Other:    Additional orders considered but not ordered:  The following testing was considered but ultimately not selected after discussion with patient/family: I considered intra-abdominal etiology the patient's pain, however he has no complaint of abdominal pain and abdomen is completely soft and nontender on exam and so do not feel that CT abdomen pelvis was warranted.    ED Course:    ED Course as of 03/23/23 1540   Thu Mar 23, 2023   1525 On reexamination, patient remains very well-appearing nontoxic.  Sitting on edge of bed resting comfortably.  Cardiac monitoring continues to demonstrate normal vital signs.  I feel that patient's presentation is most consistent with musculoskeletal type pain.  He has no dyspnea, tachycardia,, or hypoxia and has no CTA evidence of PE, pneumonia, or pneumothorax.  No complaint of abdominal pain at this time and abdomen is completely soft and nontender and labs are also reassuring without any evidence of intra-abdominal source of the patient's pain.  I did discuss with him the left axillary lymph node and instructed him to follow-up with his primary care provider regarding this which she expressed understanding of and agrees to do.  Not consistent with an atypical ACS presentation and ECG and troponin x2 have been unremarkable.  [NS]      ED Course User Index  [NS] Javid Rodriguez MD             I had a discussion with the patient/family regarding diagnosis, diagnostic results, treatment plan, and medications.  The patient/family indicated understanding of these instructions.  I spent adequate time at the bedside preceding discharge necessary to personally discuss the aftercare instructions, giving patient education, providing explanations of the results of our evaluations/findings, and my decision making to assure that the patient/family understand the plan of care.  Time was allotted to answer questions at that time and throughout the ED course.  Emphasis was placed on timely follow-up after discharge.  I also discussed the potential for the development of an acute emergent condition requiring further evaluation, admission, or even surgical intervention. I discussed that we found nothing during the visit today indicating the need for further workup, admission, or the presence of an unstable medical condition.  I encouraged the patient to return to the emergency department immediately for ANY concerns, worsening, new complaints, or if symptoms persist and unable to seek follow-up in a timely fashion.  The patient/family expressed understanding and agreement with this plan.  The patient will follow-up with their PCP in 1-2 days for reevaluation.           PROCEDURES:  Procedures    CRITICAL CARE TIME        FINAL IMPRESSION      1. Acute chest pain    2. Acute right-sided thoracic back pain    3. History of pulmonary embolism    4. History of hypertension    5. Enlarged lymph node          DISPOSITION/PLAN     ED Disposition     ED Disposition   Discharge    Condition   Stable    Comment   --               Comment: Please note this report has been produced using speech recognition software.      Javid Rodriguez MD  Attending Emergency Physician           Javid Rodriguez MD  03/23/23 7075

## 2023-03-23 NOTE — DISCHARGE INSTRUCTIONS
Your CT scan showed an enlarged lymph node in the area of your left armpit. Please follow up with your PCP regarding this.

## 2023-03-23 NOTE — PROGRESS NOTES
Subjective   Satish Will is a 42 y.o. male  Back Pain (Rt low back x1 day. Worse with movement, taking deep breaths) and Extremity Weakness (BLE, no falls, gets worse as day progresses)      History of Present Illness  Patient is a pleasant 42-year-old black male who comes in complaining of pleuritic chest pain low back pain x1 day worse with movement has pain with taking a deep breath he has a previous history of bilateral deep venous thrombus and bilateral pulmonary embolism patient states this feels exactly like he felt before when he had the PEs bilaterally.  Patient does take Eliquis but does admitting to missing doses pay states he still is extremely fatigued no energy.  Does complain of shortness of breath.  Bilateral extremity weakness worsening as the day progresses      The following portions of the patient's history were reviewed and updated as appropriate: allergies, current medications, past social history and problem list    Review of Systems   Constitutional: Negative for fatigue and unexpected weight change.   Respiratory: Positive for chest tightness and shortness of breath. Negative for cough.    Cardiovascular: Positive for chest pain and leg swelling. Negative for palpitations.   Gastrointestinal: Negative for nausea.   Skin: Negative for color change and rash.   Neurological: Negative for dizziness, syncope, weakness and headaches.       Objective     Vitals:    03/23/23 1102   BP: 136/86   Pulse: 78   Resp: 18   SpO2: 100%       Physical Exam  Vitals and nursing note reviewed.   Constitutional:       General: He is not in acute distress.     Appearance: Normal appearance. He is well-developed. He is not ill-appearing, toxic-appearing or diaphoretic.   Neck:      Thyroid: No thyromegaly.      Vascular: No JVD.   Cardiovascular:      Rate and Rhythm: Normal rate and regular rhythm.      Pulses: Normal pulses.      Heart sounds: Normal heart sounds. No murmur heard.  Pulmonary:      Effort:  Pulmonary effort is normal.      Breath sounds: Normal breath sounds.   Abdominal:      Palpations: Abdomen is soft. There is no mass.      Tenderness: There is no abdominal tenderness.   Musculoskeletal:      Cervical back: Neck supple.   Lymphadenopathy:      Cervical: No cervical adenopathy.   Skin:     General: Skin is warm and dry.   Neurological:      Mental Status: He is alert.      Sensory: No sensory deficit.         Assessment & Plan     Diagnoses and all orders for this visit:    1. Pleuritic chest pain (Primary)    2. Weakness    Sent to the ER    I spent 15 minutes in patient care: Reviewing records prior to the visit, examining the patient, entering orders and documentation    Part of this note may be an electronic transcription/translation of spoken language to printed text using the Dragon Dictation System.

## 2023-03-28 LAB
QT INTERVAL: 388 MS
QT INTERVAL: 398 MS
QTC INTERVAL: 436 MS
QTC INTERVAL: 444 MS

## 2023-05-11 ENCOUNTER — TELEPHONE (OUTPATIENT)
Dept: FAMILY MEDICINE CLINIC | Facility: CLINIC | Age: 43
End: 2023-05-11
Payer: COMMERCIAL

## 2023-05-11 NOTE — TELEPHONE ENCOUNTER
I spoke with patient and he is going to call back with the fax number to send a letter.    He can't submit new FMLA forms until later this month so is requesting a letter stating he can work no more than 40 hrs/week due to bilateral foot pain, leg cramps, back pain and back spasms. Episodes are intermittent and could occur 2-3x/week with each episode lasting one day. This information is from his previous FMLA paperwork.

## 2023-05-11 NOTE — TELEPHONE ENCOUNTER
Provider: MANNY TRINIDAD    Caller: MARISOL SHARMA    Phone Number: [phone]    Reason for Call: PATIENT IS CALLING BACK TO PROVIDE FAX NUMBER FOR LETTER .    FAX NUMBER: [phone]  ATTN: LOWELL PEÑA

## 2023-05-11 NOTE — TELEPHONE ENCOUNTER
PATIENT HAS CALLED REQUESTING A CALL BACK FROM BAYLEE IN REGARDS TO MEDICAL UPDATE FOR WORK.    CALL BACK NUMBER [phone]

## 2023-06-07 ENCOUNTER — TELEPHONE (OUTPATIENT)
Dept: FAMILY MEDICINE CLINIC | Facility: CLINIC | Age: 43
End: 2023-06-07
Payer: COMMERCIAL

## 2023-06-07 NOTE — TELEPHONE ENCOUNTER
I spoke with patient and he is going to drop off FMLA forms tomorrow. He said that no changes need to be made from previous forms.

## 2023-06-07 NOTE — TELEPHONE ENCOUNTER
"  Caller: Satish Will \"Piero\"    Relationship: Self    Best call back number [phone]    What is the best time to reach you: ANYTIME    Who are you requesting to speak with (clinical staff, provider,  specific staff member): BAYLEE    Do you know the name of the person who called: PIERO/ PATIENT    What was the call regarding: DISCUSS FORMS HE NEEDS COMPLETED    Is it okay if the provider responds through MyChart: NA    "

## 2023-06-13 ENCOUNTER — TELEPHONE (OUTPATIENT)
Dept: FAMILY MEDICINE CLINIC | Facility: CLINIC | Age: 43
End: 2023-06-13
Payer: COMMERCIAL

## 2023-06-13 NOTE — TELEPHONE ENCOUNTER
"  Caller: Satish Will \"Piero\"    Relationship: Self    Best call back number: [phone]     What form or medical record are you requesting: FMLA PAPERWORK      Additional notes:  PATIENT ASK THAT BAYLEE CALL HIM BACK REGARDING HIS [place] PAPERWORK.      "

## 2023-08-25 ENCOUNTER — TELEPHONE (OUTPATIENT)
Dept: FAMILY MEDICINE CLINIC | Facility: CLINIC | Age: 43
End: 2023-08-25
Payer: COMMERCIAL

## 2023-08-25 NOTE — TELEPHONE ENCOUNTER
"  Caller: Satish Will \"Piero\"    Relationship: Self    Best call back number: [phone]    What is the best time to reach you: ANYTIME    Who are you requesting to speak with (clinical staff, provider,  specific staff member): BAYLEE    Do you know the name of the person who called: MANNY FRANKLIN/ PIERO    What was the call regarding: CHANGES ON [employer] PAPERWORK    Is it okay if the provider responds through MyChart:           "

## 2023-08-25 NOTE — TELEPHONE ENCOUNTER
I spoke with patient and he needed updated episodes to be changed from 2-3 to 2-4. Darrell has signed changes and it is noted that it goes into effect August 21, 2023. Paperwork has been faxed.

## 2023-10-12 DIAGNOSIS — N52.9 ERECTILE DYSFUNCTION, UNSPECIFIED ERECTILE DYSFUNCTION TYPE: ICD-10-CM

## 2023-10-12 RX ORDER — TADALAFIL 20 MG/1
TABLET ORAL
Qty: 30 TABLET | Refills: 3 | Status: SHIPPED | OUTPATIENT
Start: 2023-10-12

## 2023-10-12 NOTE — TELEPHONE ENCOUNTER
"    Caller: Satish Will \"Piero\"    Relationship: Self    Best call back number: [phone]     Requested Prescriptions:   Requested Prescriptions     Pending Prescriptions Disp Refills    tadalafil (Cialis) 20 MG tablet 30 tablet 3     Sig: Take one tablet by mouth every three days.        Pharmacy where request should be sent: [facility] [id number] [address]  AT [facility] [address] [address] - [phone]  - [phone] FX     Last office visit with prescribing clinician: 3/23/2023   Last telemedicine visit with prescribing clinician: Visit date not found   Next office visit with prescribing clinician: Visit date not found         Does the patient have less than a 3 day supply:  [x] Yes  [] No    Would you like a call back once the refill request has been completed: [] Yes [x] No    If the office needs to give you a call back, can they leave a voicemail: [] Yes [x] No    Latha Canas Rep   10/12/23 09:24 EDT           "

## 2024-01-31 NOTE — TELEPHONE ENCOUNTER
"    Caller: NicoletteSatish \"Piero\"    Relationship: Self    Best call back number: [phone]     Requested Prescriptions:   Requested Prescriptions     Pending Prescriptions Disp Refills    apixaban (ELIQUIS) 5 MG tablet tablet 180 tablet 3     Sig: Take 1 tablet by mouth Every 12 (Twelve) Hours. Indications: DVT/PE (active thrombosis)        Pharmacy where request should be sent: [place] [id number] - [phone] [facility] [phone] FX     Last office visit with prescribing clinician: 3/23/2023   Last telemedicine visit with prescribing clinician: Visit date not found   Next office visit with prescribing clinician: Visit date not found     Additional details provided by patient: PATIENT IS OUT OF MEDICATION AND PHARMACY HAS STATED THE MEDICATION IS     Does the patient have less than a 3 day supply:  [x] Yes  [] No    Would you like a call back once the refill request has been completed: [] Yes [x] No    If the office needs to give you a call back, can they leave a voicemail: [] Yes [x] No    Latha Christine Rep   24 08:38 EST           "

## 2024-03-08 ENCOUNTER — TELEPHONE (OUTPATIENT)
Dept: FAMILY MEDICINE CLINIC | Facility: CLINIC | Age: 44
End: 2024-03-08
Payer: COMMERCIAL

## 2024-03-08 NOTE — TELEPHONE ENCOUNTER
"Caller: Satish Will \"Piero\"    Relationship: Self    Best call back number:       [phone] (Mobile)     What is the best time to reach you:     PATIENT REQUESTED IF HE IS NOT AVAILABLE TO TAKE A RETURN CALL, TO LEAVE A MESSAGE    Who are you requesting to speak with (clinical staff, provider,  specific staff member):     BAYLEE    What was the call regarding:     PATIENT STATED HE DROPPED FMLA PAPERWORK OFF AT THE OFFICE FOR BAYLEE ON MONDAY, 3/4    PATIENT REQUESTED A CALL BACK WITH CONFIRMATION THAT PAPERWORK WAS COMPLETED AND FORWARDED TO HIS EMPLOYER    "

## 2024-09-10 DIAGNOSIS — I10 PRIMARY HYPERTENSION: ICD-10-CM

## 2024-09-10 RX ORDER — BISOPROLOL FUMARATE 5 MG/1
5 TABLET, FILM COATED ORAL
Qty: 30 TABLET | Refills: 3 | Status: SHIPPED | OUTPATIENT
Start: 2024-09-10

## 2025-02-28 ENCOUNTER — LAB (OUTPATIENT)
Dept: FAMILY MEDICINE CLINIC | Facility: CLINIC | Age: 45
End: 2025-02-28
Payer: COMMERCIAL

## 2025-02-28 ENCOUNTER — OFFICE VISIT (OUTPATIENT)
Dept: FAMILY MEDICINE CLINIC | Facility: CLINIC | Age: 45
End: 2025-02-28
Payer: COMMERCIAL

## 2025-02-28 VITALS
WEIGHT: 315 LBS | DIASTOLIC BLOOD PRESSURE: 92 MMHG | BODY MASS INDEX: 44.1 KG/M2 | OXYGEN SATURATION: 96 % | SYSTOLIC BLOOD PRESSURE: 138 MMHG | HEIGHT: 71 IN | HEART RATE: 84 BPM | RESPIRATION RATE: 18 BRPM

## 2025-02-28 DIAGNOSIS — R53.83 OTHER FATIGUE: ICD-10-CM

## 2025-02-28 DIAGNOSIS — R25.2 MUSCLE CRAMPS: ICD-10-CM

## 2025-02-28 DIAGNOSIS — J01.00 ACUTE NON-RECURRENT MAXILLARY SINUSITIS: Primary | ICD-10-CM

## 2025-02-28 LAB
ALBUMIN SERPL-MCNC: 4.1 G/DL (ref 3.5–5.2)
ALBUMIN/GLOB SERPL: 1 G/DL
ALP SERPL-CCNC: 83 U/L (ref 39–117)
ALT SERPL W P-5'-P-CCNC: 19 U/L (ref 1–41)
ANION GAP SERPL CALCULATED.3IONS-SCNC: 12.5 MMOL/L (ref 5–15)
AST SERPL-CCNC: 20 U/L (ref 1–40)
BILIRUB SERPL-MCNC: 0.7 MG/DL (ref 0–1.2)
BUN SERPL-MCNC: 17 MG/DL (ref 6–20)
BUN/CREAT SERPL: 15.2 (ref 7–25)
CALCIUM SPEC-SCNC: 9.4 MG/DL (ref 8.6–10.5)
CHLORIDE SERPL-SCNC: 99 MMOL/L (ref 98–107)
CHOLEST SERPL-MCNC: 271 MG/DL (ref 0–200)
CO2 SERPL-SCNC: 25.5 MMOL/L (ref 22–29)
CREAT SERPL-MCNC: 1.12 MG/DL (ref 0.76–1.27)
EGFRCR SERPLBLD CKD-EPI 2021: 83.6 ML/MIN/1.73
GLOBULIN UR ELPH-MCNC: 4.1 GM/DL
GLUCOSE SERPL-MCNC: 90 MG/DL (ref 65–99)
HDLC SERPL-MCNC: 66 MG/DL (ref 40–60)
LDLC SERPL CALC-MCNC: 174 MG/DL (ref 0–100)
LDLC/HDLC SERPL: 2.58 {RATIO}
POTASSIUM SERPL-SCNC: 4.4 MMOL/L (ref 3.5–5.2)
PROT SERPL-MCNC: 8.2 G/DL (ref 6–8.5)
SODIUM SERPL-SCNC: 137 MMOL/L (ref 136–145)
TRIGL SERPL-MCNC: 172 MG/DL (ref 0–150)
TSH SERPL DL<=0.05 MIU/L-ACNC: 0.8 UIU/ML (ref 0.27–4.2)
VLDLC SERPL-MCNC: 31 MG/DL (ref 5–40)

## 2025-02-28 PROCEDURE — 84443 ASSAY THYROID STIM HORMONE: CPT | Performed by: PHYSICIAN ASSISTANT

## 2025-02-28 PROCEDURE — 80061 LIPID PANEL: CPT | Performed by: PHYSICIAN ASSISTANT

## 2025-02-28 PROCEDURE — 80053 COMPREHEN METABOLIC PANEL: CPT | Performed by: PHYSICIAN ASSISTANT

## 2025-02-28 PROCEDURE — 99213 OFFICE O/P EST LOW 20 MIN: CPT | Performed by: PHYSICIAN ASSISTANT

## 2025-02-28 PROCEDURE — 36415 COLL VENOUS BLD VENIPUNCTURE: CPT | Performed by: PHYSICIAN ASSISTANT

## 2025-02-28 NOTE — ADDENDUM NOTE
Addended by: MARIBEL CASTILLO on: 2/28/2025 03:03 PM     Modules accepted: Orders, Level of Service

## 2025-02-28 NOTE — PROGRESS NOTES
Subjective   Satish Will is a 44 y.o. male  PND and Leg Pain (Spasms in legs, worse when extended/stretching)      Leg Pain       History of Present Illness  The patient is a 44-year-old male who presents for evaluation of sinus congestion, muscle cramps, and weight management.    He reports persistent sinus congestion following a recent illness that resulted in temporary voice loss. He has been unable to expel the residual phlegm. He does not experience any associated sore throat or nighttime coughing.    He experiences muscle cramps, particularly when stretching. He recalls an incident yesterday where he experienced a foot cramp while stretching, which has since resolved. The frequency of these cramps varies, with periods of weeks without any episodes, followed by a day of cramping. He also reports occasional back cramps. He maintains adequate hydration and a balanced diet. His physical activity is limited to walking.    He acknowledges a high alcohol intake and admits to not consuming any food today, with his last meal being yesterday.  Complains of fatigue     SOCIAL HISTORY  He admits to consuming alcohol.    The following portions of the patient's history were reviewed and updated as appropriate: allergies, current medications, past social history and problem list    Review of Systems   Constitutional:  Negative for chills, fatigue and fever.   HENT:  Positive for congestion, ear pain, postnasal drip, rhinorrhea and sinus pressure. Negative for sore throat.    Eyes:  Positive for pain.   Respiratory:  Positive for cough. Negative for shortness of breath.    Neurological:  Positive for headaches. Negative for dizziness.   Hematological:  Negative for adenopathy.       Objective     Vitals:    02/28/25 1256   BP: 138/92   Pulse: 84   Resp: 18   SpO2: 96%       Physical Exam  Vitals and nursing note reviewed.   Constitutional:       Appearance: He is well-developed.   HENT:      Head: Normocephalic and  atraumatic.      Right Ear: Tympanic membrane and ear canal normal.      Left Ear: Tympanic membrane and ear canal normal.      Nose: Mucosal edema and rhinorrhea present.      Right Sinus: Maxillary sinus tenderness and frontal sinus tenderness present.      Left Sinus: Maxillary sinus tenderness and frontal sinus tenderness present.      Mouth/Throat:      Pharynx: No oropharyngeal exudate.   Eyes:      Pupils: Pupils are equal, round, and reactive to light.   Cardiovascular:      Rate and Rhythm: Normal rate and regular rhythm.   Pulmonary:      Effort: Pulmonary effort is normal.      Breath sounds: Normal breath sounds.       Physical Exam  Wheezing noted in the lungs.    Vital Signs  Weight is 392 pounds.    Assessment & Plan   Assessment & Plan  1. Sinus congestion.  He presents with significant postnasal drainage, indicative of sinus congestion. An antibiotic regimen will be initiated to address this condition.    2. Muscle cramps.  He reports experiencing muscle cramps, particularly when stretching. These cramps could potentially be attributed to his elevated weight. A comprehensive blood workup will be conducted to rule out any underlying conditions such as diabetes, electrolyte imbalances, anemia, or thyroid dysfunction that could be contributing to the cramps.    3. Weight management.  He has been informed of the necessity to reduce his weight, given his current weight of 392 pounds. He has been advised to maintain a food diary to monitor his caloric intake. The potential use of weight loss injections will be considered pending the results of his blood work.    Diagnoses and all orders for this visit:    1. Acute non-recurrent maxillary sinusitis (Primary)  -     cefdinir (OMNICEF) 300 MG capsule; Take 1 capsule by mouth 2 (Two) Times a Day.  Dispense: 20 capsule; Refill: 0    2. Other fatigue  -     Comprehensive metabolic panel; Future  -     Cancel: CBC (No Diff); Future  -     Lipid Panel; Future  -      Cancel: Hemoglobin A1c; Future  -     TSH; Future  -     Hemoglobin A1c; Future  -     CBC (No Diff); Future    3. Muscle cramps  -     Cancel: Hemoglobin A1c; Future  -     TSH; Future  -     Hemoglobin A1c; Future  -     CBC (No Diff); Future       I spent 15 minutes in patient care: Reviewing records prior to the visit, examining the patient, entering orders and documentation    Part of this note may be an electronic transcription/translation of spoken language to printed text using the Dragon Dictation System.     Patient or patient representative verbalized consent for the use of Ambient Listening during the visit with  MANNY Vivar for chart documentation. 3/3/2025  13:33 EST

## 2025-03-03 ENCOUNTER — TELEPHONE (OUTPATIENT)
Dept: FAMILY MEDICINE CLINIC | Facility: CLINIC | Age: 45
End: 2025-03-03
Payer: COMMERCIAL

## 2025-03-03 ENCOUNTER — LAB (OUTPATIENT)
Dept: LAB | Facility: HOSPITAL | Age: 45
End: 2025-03-03
Payer: COMMERCIAL

## 2025-03-03 DIAGNOSIS — R53.83 OTHER FATIGUE: ICD-10-CM

## 2025-03-03 DIAGNOSIS — R25.2 MUSCLE CRAMPS: ICD-10-CM

## 2025-03-03 LAB
DEPRECATED RDW RBC AUTO: 40.4 FL (ref 37–54)
ERYTHROCYTE [DISTWIDTH] IN BLOOD BY AUTOMATED COUNT: 13.2 % (ref 12.3–15.4)
HBA1C MFR BLD: 5.6 % (ref 4.8–5.6)
HCT VFR BLD AUTO: 49.2 % (ref 37.5–51)
HGB BLD-MCNC: 16.1 G/DL (ref 13–17.7)
MCH RBC QN AUTO: 27.9 PG (ref 26.6–33)
MCHC RBC AUTO-ENTMCNC: 32.7 G/DL (ref 31.5–35.7)
MCV RBC AUTO: 85.3 FL (ref 79–97)
PLATELET # BLD AUTO: 294 10*3/MM3 (ref 140–450)
PMV BLD AUTO: 9.3 FL (ref 6–12)
RBC # BLD AUTO: 5.77 10*6/MM3 (ref 4.14–5.8)
WBC NRBC COR # BLD AUTO: 9.24 10*3/MM3 (ref 3.4–10.8)

## 2025-03-03 PROCEDURE — 83036 HEMOGLOBIN GLYCOSYLATED A1C: CPT

## 2025-03-03 PROCEDURE — 36415 COLL VENOUS BLD VENIPUNCTURE: CPT

## 2025-03-03 PROCEDURE — 85027 COMPLETE CBC AUTOMATED: CPT

## 2025-03-03 RX ORDER — CEFDINIR 300 MG/1
300 CAPSULE ORAL 2 TIMES DAILY
Qty: 20 CAPSULE | Refills: 0 | Status: SHIPPED | OUTPATIENT
Start: 2025-03-03

## 2025-03-03 RX ORDER — CEFDINIR 300 MG/1
300 CAPSULE ORAL 2 TIMES DAILY
Qty: 20 CAPSULE | Refills: 0 | Status: SHIPPED | OUTPATIENT
Start: 2025-03-03 | End: 2025-03-03 | Stop reason: SDUPTHER

## 2025-03-03 NOTE — ADDENDUM NOTE
Addended by: MARIBEL CASTILLO on: 3/3/2025 01:31 PM     Modules accepted: Orders, Level of Service

## 2025-03-03 NOTE — TELEPHONE ENCOUNTER
Pharmacy Name:  [facility]    Reference Number (if applicable):     Pharmacy representative name:     Pharmacy representative phone number: [phone]     What medication are you calling in regards to: ANTIBIOTICS FROM LAST VISIT THAT WAS NOT CALLED IN.     What question does the pharmacy have: PATIENT WAS INQUIRING ABOUT AN ANTIBIOTICS THAT WAS SUPPOSE TO BE CALLED IN.     Who is the provider that prescribed the medication: MARIBEL CASTILLO    Additional notes: ANTIBIOTICS NEEDED

## 2025-03-17 DIAGNOSIS — I10 PRIMARY HYPERTENSION: ICD-10-CM

## 2025-03-17 RX ORDER — BISOPROLOL FUMARATE 5 MG/1
5 TABLET, FILM COATED ORAL DAILY
Qty: 30 TABLET | Refills: 3 | Status: SHIPPED | OUTPATIENT
Start: 2025-03-17

## 2025-03-28 RX ORDER — APIXABAN 5 MG/1
5 TABLET, FILM COATED ORAL EVERY 12 HOURS SCHEDULED
Qty: 180 TABLET | Refills: 3 | Status: SHIPPED | OUTPATIENT
Start: 2025-03-28

## 2025-04-01 ENCOUNTER — OFFICE VISIT (OUTPATIENT)
Dept: FAMILY MEDICINE CLINIC | Facility: CLINIC | Age: 45
End: 2025-04-01
Payer: COMMERCIAL

## 2025-04-01 VITALS
SYSTOLIC BLOOD PRESSURE: 146 MMHG | DIASTOLIC BLOOD PRESSURE: 98 MMHG | BODY MASS INDEX: 44.1 KG/M2 | HEIGHT: 71 IN | RESPIRATION RATE: 16 BRPM | HEART RATE: 104 BPM | WEIGHT: 315 LBS | OXYGEN SATURATION: 98 %

## 2025-04-01 DIAGNOSIS — E78.5 HYPERLIPIDEMIA, UNSPECIFIED HYPERLIPIDEMIA TYPE: ICD-10-CM

## 2025-04-01 DIAGNOSIS — M10.071 ACUTE IDIOPATHIC GOUT OF RIGHT FOOT: Primary | ICD-10-CM

## 2025-04-01 PROCEDURE — 99214 OFFICE O/P EST MOD 30 MIN: CPT | Performed by: PHYSICIAN ASSISTANT

## 2025-04-01 RX ORDER — COLCHICINE 0.6 MG/1
0.6 TABLET ORAL 2 TIMES DAILY
Qty: 24 TABLET | Refills: 0 | Status: SHIPPED | OUTPATIENT
Start: 2025-04-01

## 2025-04-01 NOTE — PROGRESS NOTES
Subjective   Satish Will is a 44 y.o. male  Toe Pain (Rt great toe started feeling stiff last week and improved for a few days. Pain came back and now the foot is swollen, tender, states it's very painful.) and Hyperlipidemia (F/U on labs. )      Toe Pain     Hyperlipidemia  Pertinent negatives include no chest pain or shortness of breath.     History of Present Illness  The patient presents for evaluation of right foot pain.    He began experiencing discomfort in his right big toe on Friday. The pain was slightly less intense on Saturday and completely subsided on Sunday. However, upon waking up on Sunday night, he experienced severe pain, describing it as feeling like a fracture, which significantly impeded his ability to stand or walk, leading to his absence from work that night. By the following night, the pain had escalated and extended beyond the big toe to the top of his foot, accompanied by noticeable swelling.  Patient also comes in for follow-up of blood work patient's lipid panel showed cholesterol of 270 and LDL cholesterol of 174  The following portions of the patient's history were reviewed and updated as appropriate: allergies, current medications, past social history and problem list    Review of Systems   Constitutional:  Negative for fatigue and unexpected weight change.   Respiratory:  Negative for cough, chest tightness and shortness of breath.    Cardiovascular:  Negative for chest pain, palpitations and leg swelling.   Gastrointestinal:  Negative for nausea.   Skin:  Negative for color change and rash.   Neurological:  Negative for dizziness, syncope, weakness and headaches.       Objective     Vitals:    04/01/25 1305   BP: 146/98   Pulse: 104   Resp: 16   SpO2: 98%       Physical Exam  Vitals and nursing note reviewed.   Constitutional:       General: He is not in acute distress.     Appearance: Normal appearance. He is well-developed. He is not ill-appearing, toxic-appearing or  diaphoretic.   Neck:      Vascular: No carotid bruit or JVD.   Cardiovascular:      Rate and Rhythm: Normal rate and regular rhythm.      Pulses: Normal pulses.      Heart sounds: Normal heart sounds. No murmur heard.  Pulmonary:      Effort: Pulmonary effort is normal. No respiratory distress.      Breath sounds: Normal breath sounds.   Abdominal:      Palpations: Abdomen is soft.      Tenderness: There is no abdominal tenderness.   Musculoskeletal:        Feet:    Feet:      Comments: swelling  Skin:     General: Skin is warm and dry.   Neurological:      Mental Status: He is alert.       Physical Exam      Assessment & Plan   Assessment & Plan  1. Gout.  He reports pain in his right big toe that started on Friday, with worsening symptoms by Sunday night. The pain is consistent with gout, characterized by severe pain and swelling in the toe and foot. Colchicine 0.6 mg has been prescribed, to be taken twice daily, with a total of 20 doses dispensed. A test for uric acid levels will be conducted to confirm the diagnosis.    2. Elevated cholesterol.  His LDL cholesterol level is significantly elevated at 174. The risk of coronary artery disease has been discussed. Lipid levels and lipoprotein A will be checked. A statin drug will be initiated once the results of the lipoprotein A test are available.    Diagnoses and all orders for this visit:    1. Acute idiopathic gout of right foot (Primary)  -     Uric acid; Future    2. Hyperlipidemia, unspecified hyperlipidemia type  -     Lipoprotein A (LPA); Future     I spent 15 minutes in patient care: Reviewing records prior to the visit, examining the patient, entering orders and documentation    Part of this note may be an electronic transcription/translation of spoken language to printed text using the Dragon Dictation System.       Patient or patient representative verbalized consent for the use of Ambient Listening during the visit with  MANNY Vivar for  chart documentation. 4/1/2025  13:47 EDT

## 2025-04-03 ENCOUNTER — TELEPHONE (OUTPATIENT)
Dept: FAMILY MEDICINE CLINIC | Facility: CLINIC | Age: 45
End: 2025-04-03
Payer: COMMERCIAL

## 2025-04-03 NOTE — TELEPHONE ENCOUNTER
"    Caller: Satish Will \"Piero\"    Relationship: Self    Best call back number: colchicine 0.6 MG tablet     Which medication are you concerned about: colchicine 0.6 MG tablet     Who prescribed you this medication: SALUD CASTILLO    When did you start taking this medication: 4/1/25    What are your concerns: CAN HE TAKE TYLENOL OR ANOTHER OTC PAIN MED ALONG WITH IT? HE NEEDS PAIN RELIEF FROM THE GOUT.    How long have you had these concerns: 4/3/25.        "

## 2025-04-07 ENCOUNTER — TELEPHONE (OUTPATIENT)
Dept: FAMILY MEDICINE CLINIC | Facility: CLINIC | Age: 45
End: 2025-04-07
Payer: COMMERCIAL

## 2025-04-07 RX ORDER — METHYLPREDNISOLONE 4 MG/1
TABLET ORAL
Qty: 21 TABLET | Refills: 0 | Status: SHIPPED | OUTPATIENT
Start: 2025-04-07

## 2025-04-07 NOTE — TELEPHONE ENCOUNTER
"  Caller: Satish Will \"Piero\"    Relationship: Self    Best call back number: [phone]     What is the best time to reach you: ANY    Who are you requesting to speak with (clinical staff, provider,  specific staff member): NATY    Do you know the name of the person who called: SELF    What was the call regarding: LIGHT DUTY AT WORK-PATIENT HAS MORE QUESTIONS. PLEASE CALL AND ADVISE.      "

## 2025-04-07 NOTE — TELEPHONE ENCOUNTER
I spoke with patient and letter has been placed up front for him regarding light duty. His son will pick it up today.

## 2025-04-07 NOTE — TELEPHONE ENCOUNTER
"  Caller: Satish Will \"Piero\"    Relationship: Self    Best call back number: [phone]     What is the best time to reach you: ANYTIME    Who are you requesting to speak with (clinical staff, provider,  specific staff member): PROVIDER    Do you know the name of the person who called: NA    What was the call regarding: PATIENT WOULD LIKE A CALL BACK TO DISCSS BEING PUT ON LIGHT DUTY AT WORK DUE TO NOT BEING ABLE TO STAND ON FEET WELL.    Is it okay if the provider responds through MyChart: NO      "

## 2025-04-08 NOTE — TELEPHONE ENCOUNTER
I spoke with patient and he needs letter faxed to [employer] -two different fax numbers.  [phone]

## 2025-04-09 ENCOUNTER — TELEPHONE (OUTPATIENT)
Dept: FAMILY MEDICINE CLINIC | Facility: CLINIC | Age: 45
End: 2025-04-09

## 2025-04-15 ENCOUNTER — LAB (OUTPATIENT)
Dept: LAB | Facility: HOSPITAL | Age: 45
End: 2025-04-15
Payer: COMMERCIAL

## 2025-04-15 DIAGNOSIS — M10.071 ACUTE IDIOPATHIC GOUT OF RIGHT FOOT: ICD-10-CM

## 2025-04-15 DIAGNOSIS — E78.5 HYPERLIPIDEMIA, UNSPECIFIED HYPERLIPIDEMIA TYPE: ICD-10-CM

## 2025-04-15 LAB — URATE SERPL-MCNC: 9.3 MG/DL (ref 3.4–7)

## 2025-04-15 PROCEDURE — 84550 ASSAY OF BLOOD/URIC ACID: CPT

## 2025-04-15 PROCEDURE — 36415 COLL VENOUS BLD VENIPUNCTURE: CPT

## 2025-04-15 PROCEDURE — 83695 ASSAY OF LIPOPROTEIN(A): CPT

## 2025-04-16 ENCOUNTER — RESULTS FOLLOW-UP (OUTPATIENT)
Dept: FAMILY MEDICINE CLINIC | Facility: CLINIC | Age: 45
End: 2025-04-16

## 2025-04-16 ENCOUNTER — OFFICE VISIT (OUTPATIENT)
Dept: FAMILY MEDICINE CLINIC | Facility: CLINIC | Age: 45
End: 2025-04-16
Payer: COMMERCIAL

## 2025-04-16 VITALS
RESPIRATION RATE: 20 BRPM | WEIGHT: 315 LBS | SYSTOLIC BLOOD PRESSURE: 118 MMHG | HEART RATE: 82 BPM | HEIGHT: 71 IN | BODY MASS INDEX: 44.1 KG/M2 | TEMPERATURE: 97.1 F | DIASTOLIC BLOOD PRESSURE: 72 MMHG | OXYGEN SATURATION: 98 %

## 2025-04-16 DIAGNOSIS — M10.00 ACUTE IDIOPATHIC GOUT, UNSPECIFIED SITE: ICD-10-CM

## 2025-04-16 DIAGNOSIS — M79.671 FOOT PAIN, RIGHT: Primary | ICD-10-CM

## 2025-04-16 LAB — LPA SERPL-SCNC: 213.3 NMOL/L

## 2025-04-16 PROCEDURE — 99213 OFFICE O/P EST LOW 20 MIN: CPT | Performed by: PHYSICIAN ASSISTANT

## 2025-04-16 RX ORDER — COLCHICINE 0.6 MG/1
0.6 TABLET ORAL 2 TIMES DAILY
Qty: 24 TABLET | Refills: 3 | Status: SHIPPED | OUTPATIENT
Start: 2025-04-16

## 2025-04-16 RX ORDER — ALLOPURINOL 300 MG/1
300 TABLET ORAL DAILY
Qty: 30 TABLET | Refills: 6 | Status: SHIPPED | OUTPATIENT
Start: 2025-04-16

## 2025-04-16 NOTE — PROGRESS NOTES
Follow-up Subjective   Satish Will is a 44 y.o. male  Gout (Right foot)      Gout    History of Present Illness  The patient is a 44-year-old male who comes in for follow-up of gout of the right foot.    Improvement in the foot condition is reported, although pain persists. Swelling and tenderness have reduced, but pain intensifies when seated or when pressure is applied to the foot. Uric acid level was previously elevated at 9.3, with a target level of less than six. Abstinence from red meat and alcohol intake has been maintained since 03/30/2025, leading to cravings for sweets. The current diet includes chicken and salmon.        The following portions of the patient's history were reviewed and updated as appropriate: allergies, current medications, past social history and problem list    Review of Systems   Constitutional: Negative.    HENT: Negative.     Eyes: Negative.    Respiratory: Negative.     Cardiovascular: Negative.    Gastrointestinal: Negative.    Endocrine: Negative.    Genitourinary: Negative.    Musculoskeletal:  Positive for gout.   Skin: Negative.    Allergic/Immunologic: Negative.    Neurological: Negative.    Hematological: Negative.    Psychiatric/Behavioral: Negative.     All other systems reviewed and are negative.      Objective     Vitals:    04/16/25 1302   BP: 118/72   Pulse: 82   Resp: 20   Temp: 97.1 °F (36.2 °C)   SpO2: 98%       Physical Exam  Vitals and nursing note reviewed.   Constitutional:       General: He is not in acute distress.     Appearance: Normal appearance. He is well-developed. He is not ill-appearing, toxic-appearing or diaphoretic.   Neck:      Vascular: No carotid bruit or JVD.   Cardiovascular:      Rate and Rhythm: Normal rate and regular rhythm.      Pulses: Normal pulses.      Heart sounds: Normal heart sounds. No murmur heard.  Pulmonary:      Effort: Pulmonary effort is normal. No respiratory distress.      Breath sounds: Normal breath sounds.    Abdominal:      Palpations: Abdomen is soft.      Tenderness: There is no abdominal tenderness.   Skin:     General: Skin is warm and dry.   Neurological:      Mental Status: He is alert.       Physical Exam  Extremities: Swelling noted in the right foot, less tender than previous visit.    Assessment & Plan   Assessment & Plan  1. Gout.  - Uric acid level is elevated at 9.3, requiring reduction to <6 to prevent further gout attacks. Reports improvement in foot condition but still experiences pain and swelling, especially with pressure.  - Physical examination reveals less tenderness and swelling in the right foot.  - Discussion includes the importance of maintaining uric acid levels <6, dietary modifications, and the role of medication in prevention versus acute management.  - Prescribed allopurinol 300 mg daily for uric acid management. Refilled colchicine for use during flare-ups. Advised on dietary changes, including increased protein intake and reduced sugar consumption.    Diagnoses and all orders for this visit:    1. Foot pain, right (Primary)  -     colchicine 0.6 MG tablet; Take 1 tablet by mouth 2 (Two) Times a Day.  Dispense: 24 tablet; Refill: 3  -     allopurinol (ZYLOPRIM) 300 MG tablet; Take 1 tablet by mouth Daily.  Dispense: 30 tablet; Refill: 6    2. Acute idiopathic gout, unspecified site  -     colchicine 0.6 MG tablet; Take 1 tablet by mouth 2 (Two) Times a Day.  Dispense: 24 tablet; Refill: 3  -     allopurinol (ZYLOPRIM) 300 MG tablet; Take 1 tablet by mouth Daily.  Dispense: 30 tablet; Refill: 6       I spent 15 minutes in patient care: Reviewing records prior to the visit, examining the patient, entering orders and documentation    Part of this note may be an electronic transcription/translation of spoken language to printed text using the Dragon Dictation System.     Patient or patient representative verbalized consent for the use of Ambient Listening during the visit with  Mckinley Ramírez  MANNY López for chart documentation. 4/16/2025  13:18 EDT

## 2025-04-16 NOTE — LETTER
Satish Will  [address]  [facility] [address]    April 16, 2025     Dear Mr. Will:    Below are the results from your recent visit:    Resulted Orders   Lipoprotein A (LPA)   Result Value Ref Range    Lipoprotein (a) 213.3 (H) <75.0 nmol/L      Comment:      Note:  Values greater than or equal to 75.0 nmol/L may         indicate an independent risk factor for CHD,         but must be evaluated with caution when applied         to non- populations due to the         influence of genetic factors on Lp(a) across         ethnicities.   Uric acid   Result Value Ref Range    Uric Acid 9.3 (H) 3.4 - 7.0 mg/dL     Overall your lipoprotein was elevated with elevated cholesterol this means you could be at high risk for heart disease we need to make sure we put you on a statin drug to lower your cholesterol      If you have any questions or concerns, please don't hesitate to call.         Sincerely,        MANNY Vivar

## 2025-05-21 ENCOUNTER — TELEPHONE (OUTPATIENT)
Dept: FAMILY MEDICINE CLINIC | Facility: CLINIC | Age: 45
End: 2025-05-21
Payer: COMMERCIAL

## 2025-05-21 NOTE — TELEPHONE ENCOUNTER
"    Caller: Satish Will \"Piero\"    Relationship: Self    Best call back number: [phone]     What form or medical record are you requesting: MODIFIED FOR FIT FOR DUTY PAPERWORK    Who is requesting this form or medical record from you: SELF    How would you like to receive the form or medical records (pick-up, mail, fax): FAX TO [facility], LEAVE HARDCOPY AT DESK FOR PATIENT TO     Timeframe paperwork needed: ASAP    Additional notes: TURNED IN PAPERWORK AT THE  ON MONDAY, 5/19/25. PLEASE CALL WITH ANY QUESTIONS OR WHEN COMPLETED.        "

## 2025-05-22 NOTE — TELEPHONE ENCOUNTER
I spoke with patient and told him that Darrell will be back in the office tomorrow to sign paperwork and he can pick it up. I will also fax it.

## 2025-07-14 ENCOUNTER — TELEPHONE (OUTPATIENT)
Dept: FAMILY MEDICINE CLINIC | Facility: CLINIC | Age: 45
End: 2025-07-14
Payer: COMMERCIAL

## 2025-07-14 NOTE — TELEPHONE ENCOUNTER
"  Caller: Satish Will \"Piero\"    Relationship: Self    Best call back number: [phone]    What is the best time to reach you: ANY TIME    Who are you requesting to speak with (clinical staff, provider,  specific staff member): BAYLEE    Do you know the name of the person who called: SELF    What was the call regarding: PATIENT IS NEEDING PAPERWORK UPDATED AND WANTED TO KNOW IF THIS PAPERWORK CAN BE UPDATED WITHOUT PATIENT BRINGING IN NEW PAPERWORK TO BE FILLED OUT? IF NOT THEN PATIENT CAN FIND A WAY TO BRING IN NEW PAPERWORK. PLEASE ADVISE AND CALL PATIENT BACK      "

## 2025-08-05 ENCOUNTER — TELEPHONE (OUTPATIENT)
Dept: FAMILY MEDICINE CLINIC | Facility: CLINIC | Age: 45
End: 2025-08-05
Payer: COMMERCIAL

## (undated) DEVICE — GLV SURG BIOGEL LTX PF 7 1/2

## (undated) DEVICE — ANTIBACTERIAL UNDYED BRAIDED (POLYGLACTIN 910), SYNTHETIC ABSORBABLE SUTURE: Brand: COATED VICRYL

## (undated) DEVICE — PK MINOR SPLT 10

## (undated) DEVICE — COVER,LIGHT HANDLE,FLX,1/PK: Brand: MEDLINE INDUSTRIES, INC.

## (undated) DEVICE — BNDR ABD PREMIUM/UNIV 10IN 27TO48IN

## (undated) DEVICE — CAUTERY TIP POLISHER: Brand: DEVON

## (undated) DEVICE — SPNG LAP PREWSH SFTPK 18X18IN STRL PK/5

## (undated) DEVICE — ELECTRD BLD 1P STD SS 3/32X2.44IN

## (undated) DEVICE — DRSNG WND GZ PAD BORDERED 4X8IN STRL

## (undated) DEVICE — GOWN,NON-REINFORCED,SIRUS,SET IN SLV,XL: Brand: MEDLINE